# Patient Record
Sex: FEMALE | Race: WHITE | Employment: OTHER | URBAN - NONMETROPOLITAN AREA
[De-identification: names, ages, dates, MRNs, and addresses within clinical notes are randomized per-mention and may not be internally consistent; named-entity substitution may affect disease eponyms.]

---

## 2017-01-11 ENCOUNTER — HOSPITAL ENCOUNTER (OUTPATIENT)
Dept: GENERAL RADIOLOGY | Age: 72
Discharge: HOME | End: 2017-01-11
Payer: MEDICARE

## 2017-01-11 DIAGNOSIS — Z01.818 PREOP EXAMINATION: ICD-10-CM

## 2017-01-11 PROCEDURE — 71020 XR CHEST STANDARD TWO VW: CPT

## 2017-08-15 ENCOUNTER — TRANSCRIBE ORDERS (OUTPATIENT)
Dept: ADMINISTRATIVE | Facility: HOSPITAL | Age: 72
End: 2017-08-15

## 2017-08-15 DIAGNOSIS — R10.9 FLANK PAIN: Primary | ICD-10-CM

## 2017-08-16 ENCOUNTER — APPOINTMENT (OUTPATIENT)
Dept: CT IMAGING | Facility: HOSPITAL | Age: 72
End: 2017-08-16
Attending: FAMILY MEDICINE

## 2017-08-18 ENCOUNTER — HOSPITAL ENCOUNTER (OUTPATIENT)
Dept: CT IMAGING | Facility: HOSPITAL | Age: 72
Discharge: HOME | End: 2017-08-18
Attending: FAMILY MEDICINE | Admitting: FAMILY MEDICINE

## 2017-08-18 DIAGNOSIS — R10.9 FLANK PAIN: ICD-10-CM

## 2017-08-18 LAB — CREAT BLDA-MCNC: 0.8 MG/DL (ref 0.6–1.3)

## 2017-08-18 PROCEDURE — 74176 CT ABD & PELVIS W/O CONTRAST: CPT

## 2017-08-18 PROCEDURE — 82565 ASSAY OF CREATININE: CPT

## 2017-11-30 ENCOUNTER — OFFICE VISIT (OUTPATIENT)
Dept: SURGERY | Age: 72
End: 2017-11-30
Payer: MEDICARE

## 2017-11-30 VITALS
SYSTOLIC BLOOD PRESSURE: 118 MMHG | BODY MASS INDEX: 24.71 KG/M2 | HEIGHT: 68 IN | HEART RATE: 76 BPM | DIASTOLIC BLOOD PRESSURE: 80 MMHG | WEIGHT: 163 LBS

## 2017-11-30 DIAGNOSIS — N63.12 MASS OF UPPER INNER QUADRANT OF RIGHT BREAST: Primary | ICD-10-CM

## 2017-11-30 PROCEDURE — 1036F TOBACCO NON-USER: CPT | Performed by: PHYSICIAN ASSISTANT

## 2017-11-30 PROCEDURE — 1090F PRES/ABSN URINE INCON ASSESS: CPT | Performed by: PHYSICIAN ASSISTANT

## 2017-11-30 PROCEDURE — G8420 CALC BMI NORM PARAMETERS: HCPCS | Performed by: PHYSICIAN ASSISTANT

## 2017-11-30 PROCEDURE — 1123F ACP DISCUSS/DSCN MKR DOCD: CPT | Performed by: PHYSICIAN ASSISTANT

## 2017-11-30 PROCEDURE — G8427 DOCREV CUR MEDS BY ELIG CLIN: HCPCS | Performed by: PHYSICIAN ASSISTANT

## 2017-11-30 PROCEDURE — 99202 OFFICE O/P NEW SF 15 MIN: CPT | Performed by: PHYSICIAN ASSISTANT

## 2017-11-30 PROCEDURE — 3017F COLORECTAL CA SCREEN DOC REV: CPT | Performed by: PHYSICIAN ASSISTANT

## 2017-11-30 PROCEDURE — 4040F PNEUMOC VAC/ADMIN/RCVD: CPT | Performed by: PHYSICIAN ASSISTANT

## 2017-11-30 PROCEDURE — 3014F SCREEN MAMMO DOC REV: CPT | Performed by: PHYSICIAN ASSISTANT

## 2017-11-30 PROCEDURE — G8400 PT W/DXA NO RESULTS DOC: HCPCS | Performed by: PHYSICIAN ASSISTANT

## 2017-11-30 PROCEDURE — G8484 FLU IMMUNIZE NO ADMIN: HCPCS | Performed by: PHYSICIAN ASSISTANT

## 2017-11-30 RX ORDER — ASCORBIC ACID 500 MG
500 TABLET ORAL DAILY
COMMUNITY

## 2017-11-30 RX ORDER — PRAVASTATIN SODIUM 40 MG
20 TABLET ORAL DAILY
COMMUNITY
Start: 2017-10-30

## 2017-11-30 RX ORDER — DIAZEPAM 5 MG/1
TABLET ORAL
Qty: 10 TABLET | Refills: 0 | OUTPATIENT
Start: 2017-11-30 | End: 2022-01-27

## 2017-11-30 RX ORDER — AMOXICILLIN 500 MG
1 CAPSULE ORAL 2 TIMES DAILY
COMMUNITY

## 2017-11-30 RX ORDER — CALCIUM CARBONATE 500(1250)
500 TABLET ORAL DAILY
COMMUNITY

## 2017-11-30 RX ORDER — METOPROLOL SUCCINATE 25 MG/1
25 TABLET, EXTENDED RELEASE ORAL NIGHTLY
COMMUNITY
Start: 2017-10-30

## 2017-12-04 ENCOUNTER — HOSPITAL ENCOUNTER (OUTPATIENT)
Dept: WOMENS IMAGING | Age: 72
Discharge: HOME | End: 2017-12-04
Payer: MEDICARE

## 2017-12-04 DIAGNOSIS — N63.12 MASS OF UPPER INNER QUADRANT OF RIGHT BREAST: ICD-10-CM

## 2017-12-04 PROCEDURE — 19083 BX BREAST 1ST LESION US IMAG: CPT

## 2017-12-04 PROCEDURE — G0206 DX MAMMO INCL CAD UNI: HCPCS

## 2017-12-04 PROCEDURE — 88305 TISSUE EXAM BY PATHOLOGIST: CPT

## 2017-12-04 PROCEDURE — 88374 M/PHMTRC ALYS ISHQUANT/SEMIQ: CPT

## 2017-12-04 PROCEDURE — 88361 TUMOR IMMUNOHISTOCHEM/COMPUT: CPT

## 2017-12-07 ENCOUNTER — TELEPHONE (OUTPATIENT)
Dept: SURGERY | Age: 72
End: 2017-12-07

## 2017-12-07 DIAGNOSIS — C50.211 MALIGNANT NEOPLASM OF UPPER-INNER QUADRANT OF RIGHT FEMALE BREAST, UNSPECIFIED ESTROGEN RECEPTOR STATUS (HCC): Primary | ICD-10-CM

## 2017-12-08 DIAGNOSIS — C50.211 MALIGNANT NEOPLASM OF UPPER-INNER QUADRANT OF RIGHT FEMALE BREAST, UNSPECIFIED ESTROGEN RECEPTOR STATUS (HCC): Primary | ICD-10-CM

## 2017-12-12 NOTE — PROGRESS NOTES
normal.   Eyes: EOM are normal. Pupils are equal, round, and reactive to light. Pulmonary/Chest: Right breast exhibits mass. Right breast exhibits no inverted nipple, no nipple discharge, no skin change and no tenderness. Left breast exhibits no inverted nipple, no mass, no nipple discharge, no skin change and no tenderness. Breasts are symmetrical.   Neurological: She is alert and oriented to person, place, and time. No cranial nerve deficit. Skin: Skin is warm and dry. No rash noted. She is not diaphoretic. No erythema. No pallor. Psychiatric: She has a normal mood and affect. Her behavior is normal. Judgment and thought content normal.       Assessment:      Right breast mass      Plan:      PLAN:  This will require US guided mammotome biopsy, which will be done in the hospital under local anesthesia. Further procedures will be done as indicated. CONSENT:  The risks, benefits and options of biopsy/US were discussed with her including but not limited to bleeding, infection, hematoma, missing the lesion, and scarring. She expresses good understanding and is agreeable to proceed.

## 2017-12-14 ENCOUNTER — HOSPITAL ENCOUNTER (OUTPATIENT)
Dept: MRI IMAGING | Age: 72
Discharge: HOME | End: 2017-12-14
Payer: MEDICARE

## 2017-12-14 DIAGNOSIS — C50.211 MALIGNANT NEOPLASM OF UPPER-INNER QUADRANT OF RIGHT FEMALE BREAST, UNSPECIFIED ESTROGEN RECEPTOR STATUS (HCC): ICD-10-CM

## 2017-12-14 LAB
GFR NON-AFRICAN AMERICAN: >60
PERFORMED ON: NORMAL
POC CREATININE: 0.9 MG/DL (ref 0.3–1.3)
POC SAMPLE TYPE: NORMAL

## 2017-12-14 PROCEDURE — A9577 INJ MULTIHANCE: HCPCS | Performed by: PHYSICIAN ASSISTANT

## 2017-12-14 PROCEDURE — 6360000004 HC RX CONTRAST MEDICATION: Performed by: PHYSICIAN ASSISTANT

## 2017-12-14 PROCEDURE — 82565 ASSAY OF CREATININE: CPT

## 2017-12-14 PROCEDURE — C8908 MRI W/O FOL W/CONT, BREAST,: HCPCS

## 2017-12-14 RX ADMIN — GADOBENATE DIMEGLUMINE 15 ML: 529 INJECTION, SOLUTION INTRAVENOUS at 10:09

## 2017-12-18 ENCOUNTER — TELEPHONE (OUTPATIENT)
Dept: WOMENS IMAGING | Age: 72
End: 2017-12-18

## 2017-12-27 ENCOUNTER — HOSPITAL ENCOUNTER (OUTPATIENT)
Dept: WOMENS IMAGING | Age: 72
Discharge: HOME | End: 2017-12-27
Payer: MEDICARE

## 2017-12-27 ENCOUNTER — INITIAL CONSULT (OUTPATIENT)
Dept: SURGERY | Age: 72
End: 2017-12-27
Payer: MEDICARE

## 2017-12-27 DIAGNOSIS — R92.8 ABNORMAL MRI, BREAST: ICD-10-CM

## 2017-12-27 DIAGNOSIS — C50.211 MALIGNANT NEOPLASM OF UPPER-INNER QUADRANT OF RIGHT FEMALE BREAST, UNSPECIFIED ESTROGEN RECEPTOR STATUS (HCC): ICD-10-CM

## 2017-12-27 DIAGNOSIS — C50.211 MALIGNANT NEOPLASM OF UPPER-INNER QUADRANT OF RIGHT BREAST IN FEMALE, ESTROGEN RECEPTOR POSITIVE (HCC): Primary | ICD-10-CM

## 2017-12-27 DIAGNOSIS — Z17.0 MALIGNANT NEOPLASM OF UPPER-INNER QUADRANT OF RIGHT BREAST IN FEMALE, ESTROGEN RECEPTOR POSITIVE (HCC): Primary | ICD-10-CM

## 2017-12-27 PROCEDURE — G8428 CUR MEDS NOT DOCUMENT: HCPCS | Performed by: SURGERY

## 2017-12-27 PROCEDURE — G8400 PT W/DXA NO RESULTS DOC: HCPCS | Performed by: SURGERY

## 2017-12-27 PROCEDURE — 3014F SCREEN MAMMO DOC REV: CPT | Performed by: SURGERY

## 2017-12-27 PROCEDURE — 1123F ACP DISCUSS/DSCN MKR DOCD: CPT | Performed by: SURGERY

## 2017-12-27 PROCEDURE — 99355 PR PROLONGED SVC OUTPATIENT SETTING EA ADDL 30 MIN: CPT | Performed by: SURGERY

## 2017-12-27 PROCEDURE — 99215 OFFICE O/P EST HI 40 MIN: CPT | Performed by: SURGERY

## 2017-12-27 PROCEDURE — 76642 ULTRASOUND BREAST LIMITED: CPT

## 2017-12-27 PROCEDURE — 99354 PR PROLONGED SVC OUTPATIENT SETTING 1ST HOUR: CPT | Performed by: SURGERY

## 2017-12-27 PROCEDURE — 3017F COLORECTAL CA SCREEN DOC REV: CPT | Performed by: SURGERY

## 2017-12-27 PROCEDURE — 1036F TOBACCO NON-USER: CPT | Performed by: SURGERY

## 2017-12-27 PROCEDURE — 4040F PNEUMOC VAC/ADMIN/RCVD: CPT | Performed by: SURGERY

## 2017-12-27 PROCEDURE — G8484 FLU IMMUNIZE NO ADMIN: HCPCS | Performed by: SURGERY

## 2017-12-27 PROCEDURE — 1090F PRES/ABSN URINE INCON ASSESS: CPT | Performed by: SURGERY

## 2017-12-27 PROCEDURE — G8420 CALC BMI NORM PARAMETERS: HCPCS | Performed by: SURGERY

## 2018-01-02 ENCOUNTER — TELEPHONE (OUTPATIENT)
Dept: SURGERY | Age: 73
End: 2018-01-02

## 2018-01-02 ENCOUNTER — OFFICE VISIT (OUTPATIENT)
Dept: SURGERY | Age: 73
End: 2018-01-02
Payer: MEDICARE

## 2018-01-02 VITALS
SYSTOLIC BLOOD PRESSURE: 122 MMHG | HEIGHT: 68 IN | HEART RATE: 72 BPM | WEIGHT: 163 LBS | DIASTOLIC BLOOD PRESSURE: 74 MMHG | BODY MASS INDEX: 24.71 KG/M2

## 2018-01-02 DIAGNOSIS — Z17.0 MALIGNANT NEOPLASM OF UPPER-INNER QUADRANT OF RIGHT BREAST IN FEMALE, ESTROGEN RECEPTOR POSITIVE (HCC): Primary | ICD-10-CM

## 2018-01-02 DIAGNOSIS — C50.211 MALIGNANT NEOPLASM OF UPPER-INNER QUADRANT OF RIGHT BREAST IN FEMALE, ESTROGEN RECEPTOR POSITIVE (HCC): Primary | ICD-10-CM

## 2018-01-02 PROCEDURE — 1090F PRES/ABSN URINE INCON ASSESS: CPT | Performed by: SURGERY

## 2018-01-02 PROCEDURE — 3017F COLORECTAL CA SCREEN DOC REV: CPT | Performed by: SURGERY

## 2018-01-02 PROCEDURE — G8400 PT W/DXA NO RESULTS DOC: HCPCS | Performed by: SURGERY

## 2018-01-02 PROCEDURE — G8484 FLU IMMUNIZE NO ADMIN: HCPCS | Performed by: SURGERY

## 2018-01-02 PROCEDURE — G8420 CALC BMI NORM PARAMETERS: HCPCS | Performed by: SURGERY

## 2018-01-02 PROCEDURE — G8428 CUR MEDS NOT DOCUMENT: HCPCS | Performed by: SURGERY

## 2018-01-02 PROCEDURE — 1036F TOBACCO NON-USER: CPT | Performed by: SURGERY

## 2018-01-02 PROCEDURE — 1123F ACP DISCUSS/DSCN MKR DOCD: CPT | Performed by: SURGERY

## 2018-01-02 PROCEDURE — 4040F PNEUMOC VAC/ADMIN/RCVD: CPT | Performed by: SURGERY

## 2018-01-02 PROCEDURE — 99215 OFFICE O/P EST HI 40 MIN: CPT | Performed by: SURGERY

## 2018-01-02 PROCEDURE — 99354 PR PROLONGED SVC OUTPATIENT SETTING 1ST HOUR: CPT | Performed by: SURGERY

## 2018-01-02 NOTE — TELEPHONE ENCOUNTER
Called and gave patient the time of her appointment for today. 01/02/18 at 4. Patient voiced her understanding.

## 2018-01-08 ENCOUNTER — HOSPITAL ENCOUNTER (OUTPATIENT)
Dept: LAB | Age: 73
Discharge: HOME | End: 2018-01-08
Payer: MEDICARE

## 2018-01-08 ENCOUNTER — HOSPITAL ENCOUNTER (OUTPATIENT)
Dept: NON INVASIVE DIAGNOSTICS | Age: 73
Discharge: HOME | End: 2018-01-08
Payer: MEDICARE

## 2018-01-08 ENCOUNTER — HOSPITAL ENCOUNTER (OUTPATIENT)
Dept: PREADMISSION TESTING | Age: 73
Setting detail: OUTPATIENT SURGERY
Discharge: HOME | End: 2018-01-08

## 2018-01-08 LAB
ANION GAP SERPL CALCULATED.3IONS-SCNC: 10 MMOL/L (ref 7–19)
BASOPHILS ABSOLUTE: 0 K/UL (ref 0–0.2)
BASOPHILS RELATIVE PERCENT: 0.5 % (ref 0–1)
BUN BLDV-MCNC: 17 MG/DL (ref 8–23)
CALCIUM SERPL-MCNC: 9.5 MG/DL (ref 8.8–10.2)
CHLORIDE BLD-SCNC: 103 MMOL/L (ref 98–111)
CO2: 32 MMOL/L (ref 22–29)
CREAT SERPL-MCNC: 0.7 MG/DL (ref 0.5–0.9)
EOSINOPHILS ABSOLUTE: 0.1 K/UL (ref 0–0.6)
EOSINOPHILS RELATIVE PERCENT: 2.2 % (ref 0–5)
GFR NON-AFRICAN AMERICAN: >60
GLUCOSE BLD-MCNC: 85 MG/DL (ref 74–109)
HCT VFR BLD CALC: 44.4 % (ref 37–47)
HEMOGLOBIN: 13.8 G/DL (ref 12–16)
LYMPHOCYTES ABSOLUTE: 1.3 K/UL (ref 1.1–4.5)
LYMPHOCYTES RELATIVE PERCENT: 23.3 % (ref 20–40)
MCH RBC QN AUTO: 28.5 PG (ref 27–31)
MCHC RBC AUTO-ENTMCNC: 31.1 G/DL (ref 33–37)
MCV RBC AUTO: 91.7 FL (ref 81–99)
MONOCYTES ABSOLUTE: 0.5 K/UL (ref 0–0.9)
MONOCYTES RELATIVE PERCENT: 8.5 % (ref 0–10)
NEUTROPHILS ABSOLUTE: 3.6 K/UL (ref 1.5–7.5)
NEUTROPHILS RELATIVE PERCENT: 65.3 % (ref 50–65)
PDW BLD-RTO: 13.6 % (ref 11.5–14.5)
PLATELET # BLD: 268 K/UL (ref 130–400)
PMV BLD AUTO: 10.3 FL (ref 9.4–12.3)
POTASSIUM SERPL-SCNC: 3.9 MMOL/L (ref 3.5–5)
RBC # BLD: 4.84 M/UL (ref 4.2–5.4)
SODIUM BLD-SCNC: 145 MMOL/L (ref 136–145)
WBC # BLD: 5.5 K/UL (ref 4.8–10.8)

## 2018-01-08 PROCEDURE — 93005 ELECTROCARDIOGRAM TRACING: CPT

## 2018-01-08 PROCEDURE — 36415 COLL VENOUS BLD VENIPUNCTURE: CPT

## 2018-01-08 PROCEDURE — 85025 COMPLETE CBC W/AUTO DIFF WBC: CPT

## 2018-01-08 PROCEDURE — 80048 BASIC METABOLIC PNL TOTAL CA: CPT

## 2018-01-08 RX ORDER — LETROZOLE 2.5 MG/1
2.5 TABLET, FILM COATED ORAL DAILY
COMMUNITY
End: 2018-10-10 | Stop reason: ALTCHOICE

## 2018-01-11 ENCOUNTER — ANESTHESIA (OUTPATIENT)
Dept: OPERATING ROOM | Age: 73
End: 2018-01-11

## 2018-01-11 ENCOUNTER — HOSPITAL ENCOUNTER (OUTPATIENT)
Age: 73
Setting detail: OUTPATIENT SURGERY
Discharge: HOME | End: 2018-01-11
Attending: SURGERY | Admitting: SURGERY
Payer: MEDICARE

## 2018-01-11 ENCOUNTER — ANESTHESIA EVENT (OUTPATIENT)
Dept: OPERATING ROOM | Age: 73
End: 2018-01-11

## 2018-01-11 ENCOUNTER — HOSPITAL ENCOUNTER (OUTPATIENT)
Dept: GENERAL RADIOLOGY | Age: 73
Discharge: HOME | End: 2018-01-11
Payer: MEDICARE

## 2018-01-11 VITALS
OXYGEN SATURATION: 99 % | DIASTOLIC BLOOD PRESSURE: 82 MMHG | RESPIRATION RATE: 18 BRPM | HEIGHT: 66 IN | SYSTOLIC BLOOD PRESSURE: 137 MMHG | BODY MASS INDEX: 25.71 KG/M2 | WEIGHT: 160 LBS | TEMPERATURE: 98.6 F | HEART RATE: 64 BPM

## 2018-01-11 VITALS — DIASTOLIC BLOOD PRESSURE: 76 MMHG | OXYGEN SATURATION: 82 % | SYSTOLIC BLOOD PRESSURE: 121 MMHG

## 2018-01-11 PROCEDURE — C1788 PORT, INDWELLING, IMP: HCPCS | Performed by: SURGERY

## 2018-01-11 PROCEDURE — G8916 PT W IV AB GIVEN ON TIME: HCPCS

## 2018-01-11 PROCEDURE — 99999 PR OFFICE/OUTPT VISIT,PROCEDURE ONLY: CPT | Performed by: PHYSICIAN ASSISTANT

## 2018-01-11 PROCEDURE — 77001 FLUOROGUIDE FOR VEIN DEVICE: CPT | Performed by: SURGERY

## 2018-01-11 PROCEDURE — 36561 INSERT TUNNELED CV CATH: CPT

## 2018-01-11 PROCEDURE — 36561 INSERT TUNNELED CV CATH: CPT | Performed by: SURGERY

## 2018-01-11 PROCEDURE — G8907 PT DOC NO EVENTS ON DISCHARG: HCPCS

## 2018-01-11 PROCEDURE — 71045 X-RAY EXAM CHEST 1 VIEW: CPT

## 2018-01-11 DEVICE — PORT INFUS PLAS SGL LUMN W/ 9.6FR SIL CATH AIRGUARD VLV: Type: IMPLANTABLE DEVICE | Site: CHEST | Status: FUNCTIONAL

## 2018-01-11 RX ORDER — MIDAZOLAM HYDROCHLORIDE 1 MG/ML
INJECTION INTRAMUSCULAR; INTRAVENOUS PRN
Status: DISCONTINUED | OUTPATIENT
Start: 2018-01-11 | End: 2018-01-11 | Stop reason: SDUPTHER

## 2018-01-11 RX ORDER — HEPARIN SODIUM (PORCINE) LOCK FLUSH IV SOLN 100 UNIT/ML 100 UNIT/ML
SOLUTION INTRAVENOUS PRN
Status: DISCONTINUED | OUTPATIENT
Start: 2018-01-11 | End: 2018-01-11 | Stop reason: HOSPADM

## 2018-01-11 RX ORDER — LIDOCAINE HYDROCHLORIDE 10 MG/ML
INJECTION, SOLUTION EPIDURAL; INFILTRATION; INTRACAUDAL; PERINEURAL PRN
Status: DISCONTINUED | OUTPATIENT
Start: 2018-01-11 | End: 2018-01-11 | Stop reason: SDUPTHER

## 2018-01-11 RX ORDER — PROPOFOL 10 MG/ML
INJECTION, EMULSION INTRAVENOUS PRN
Status: DISCONTINUED | OUTPATIENT
Start: 2018-01-11 | End: 2018-01-11 | Stop reason: SDUPTHER

## 2018-01-11 RX ORDER — LIDOCAINE HYDROCHLORIDE 10 MG/ML
1 INJECTION, SOLUTION EPIDURAL; INFILTRATION; INTRACAUDAL; PERINEURAL
Status: DISCONTINUED | OUTPATIENT
Start: 2018-01-11 | End: 2018-01-11 | Stop reason: HOSPADM

## 2018-01-11 RX ORDER — HYDROCODONE BITARTRATE AND ACETAMINOPHEN 5; 325 MG/1; MG/1
TABLET ORAL
Qty: 15 TABLET | Refills: 0 | Status: SHIPPED | OUTPATIENT
Start: 2018-01-11 | End: 2018-01-17

## 2018-01-11 RX ORDER — HYDROCODONE BITARTRATE AND ACETAMINOPHEN 5; 325 MG/1; MG/1
1 TABLET ORAL EVERY 4 HOURS PRN
Status: DISCONTINUED | OUTPATIENT
Start: 2018-01-11 | End: 2018-01-11 | Stop reason: HOSPADM

## 2018-01-11 RX ORDER — SODIUM CHLORIDE, SODIUM LACTATE, POTASSIUM CHLORIDE, CALCIUM CHLORIDE 600; 310; 30; 20 MG/100ML; MG/100ML; MG/100ML; MG/100ML
INJECTION, SOLUTION INTRAVENOUS CONTINUOUS
Status: DISCONTINUED | OUTPATIENT
Start: 2018-01-11 | End: 2018-01-11 | Stop reason: HOSPADM

## 2018-01-11 RX ORDER — FENTANYL CITRATE 50 UG/ML
INJECTION, SOLUTION INTRAMUSCULAR; INTRAVENOUS PRN
Status: DISCONTINUED | OUTPATIENT
Start: 2018-01-11 | End: 2018-01-11 | Stop reason: SDUPTHER

## 2018-01-11 RX ADMIN — FENTANYL CITRATE 50 MCG: 50 INJECTION, SOLUTION INTRAMUSCULAR; INTRAVENOUS at 10:05

## 2018-01-11 RX ADMIN — HYDROCODONE BITARTRATE AND ACETAMINOPHEN 1 TABLET: 5; 325 TABLET ORAL at 11:51

## 2018-01-11 RX ADMIN — MIDAZOLAM HYDROCHLORIDE 1 MG: 1 INJECTION INTRAMUSCULAR; INTRAVENOUS at 10:05

## 2018-01-11 RX ADMIN — PROPOFOL 150 MG: 10 INJECTION, EMULSION INTRAVENOUS at 10:06

## 2018-01-11 RX ADMIN — LIDOCAINE HYDROCHLORIDE 10 ML: 10 INJECTION, SOLUTION EPIDURAL; INFILTRATION; INTRACAUDAL; PERINEURAL at 10:42

## 2018-01-11 RX ADMIN — SODIUM CHLORIDE, SODIUM LACTATE, POTASSIUM CHLORIDE, CALCIUM CHLORIDE: 600; 310; 30; 20 INJECTION, SOLUTION INTRAVENOUS at 08:47

## 2018-01-11 RX ADMIN — LIDOCAINE HYDROCHLORIDE 10 ML: 10 INJECTION, SOLUTION EPIDURAL; INFILTRATION; INTRACAUDAL; PERINEURAL at 10:25

## 2018-01-11 ASSESSMENT — PAIN SCALES - GENERAL: PAINLEVEL_OUTOF10: 5

## 2018-01-11 NOTE — H&P
EXAM:    The patient is a 67 y.o. female  in no acute distress. She is alert oriented and cooperative. HEENT: Normocephalic and atraumatic. EOMs intact. Pupils equal and round and reactive to light and accommodation. Sclere nonicteric. Oropharynx without masses or lesions. Neck: Neck is supple without masses or thyromegaly    Chest: Lungs are clear to auscultation    Cardiac: Regular rate and rhythm without rubs, murmurs, or gallops    Breasts: The breasts are symmetrical. There are fibrocystic changes throughout both breasts. There are no dominant masses, no skin or nipple changes, and no axillary adenopathy. Abdomen: The abdomen is soft and nontender with no hepatosplenomegaly. Extremities: The extremities are normal. There are no signs of clubbing, cyanosis, or edema. 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 Stephen Lara  and her family about the ramifications of the diagnosis of breast cancer. We discussed the pathophysiology of cancer in general and also the ways in which surgery, radiation therapy, and chemotherapy are utilized in the treatment of different types of cancers. We also explained how these modalities related to her situation in particular. We discussed the pathophysiology of breast cancer and some length, including what is known about the causes of breast cancer, its relationship to fibrocystic disease, its relationship to hormone replacement therapy, and some of the genetic aspects involved in familial breast cancers. We discussed the BrCa genetic analysis and why it is not appropriate for her. We discussed breast MRI and how it assists in evaluation of breast cancers and the results of her MRI if done.       We discussed the surgical options including simple mastectomy and lumpectomy with  sentinel lymph node biopsy as well as the possibility of axillary lymph node dissection.   We explained in depth why breast conservation therapy requires radiation treatments for the majority of women. These treatments may be external beam for 6 weeks, partial breast for 5 days,  or intraoperative. I explained that most women treated for invasive malignancy do receive systemic therapy, hormonal therapy or  chemotherapy postoperatively depending upon the final pathology, the lymph node status, and the hormone receptor status. I discussed Oncotype Dx, Mammoprint, and Adjuvant Online as tools which aid in the decision for chemotherapy or hormonal therapy. We also discussed the possibility of breast reconstruction if a mastectomy was required. .  I explained to her the different techniques including placement of a subpectoral implant with a Alloderm sling  versus TRAM flap reconstruction as welll as other methods of reconstruction. She does not wish to pursue reconstruction at this time.         After a prolonged discussion lasting 90 minutes  we felt it was most appropriate that she undergo neoadjuvant hormonal therapy.       She was started on Femara for this and we anticipate following up in 6-8 weeks.     Unfortunately the HER-2 positive by FISH was not available at that time and after discussion with Dr. Vimal Remy, we will need to see her back for consideration of neoadjuvant chemotherapy instead. We will see her back in the next few days to discuss this further. She has been notified. .     We had additional discussion for about an hour today. We discussed HER-2, and what chemotherapy would likely entail. She had many additional questions. We will plan to have her see Dr. Vimal Remy soon to begin her chemotherapy including anterior HER-2 therapy. We will schedule her for port for a day or so after she sees Dr. Vimal Remy.     We discussed the risks and benefits of port placement. We discussed the overall plan and she and her  seem to have a good understanding.

## 2018-01-12 NOTE — OP NOTE
attached  the port in the usual fashion, sutured the port in place with 2-0 silk  stitches, flushed and aspirated the port with heparinized saline, irrigated  the pocket with Kefzol-bacitracin solution and closed with 3-0 Vicryl for  subcu and 4-0 Monocryl for the skin. A sterile dressing was applied. Estimated blood loss, minimal.  Complications, none. She tolerated the  procedure well.         Kenzie Cannon MD    D: 01/11/2018 12:20:43       T: 01/11/2018 12:42:18     [id number]  Job#: [id number]     Doc#: [id number]    CC:

## 2018-01-17 PROBLEM — Z17.0 MALIGNANT NEOPLASM OF UPPER-INNER QUADRANT OF RIGHT BREAST IN FEMALE, ESTROGEN RECEPTOR POSITIVE (HCC): Status: ACTIVE | Noted: 2017-12-08

## 2018-01-24 ENCOUNTER — HOSPITAL ENCOUNTER (OUTPATIENT)
Dept: NUCLEAR MEDICINE | Age: 73
Discharge: HOME | End: 2018-01-24
Payer: MEDICARE

## 2018-01-24 ENCOUNTER — HOSPITAL ENCOUNTER (OUTPATIENT)
Dept: CT IMAGING | Age: 73
Discharge: HOME | End: 2018-01-24
Payer: MEDICARE

## 2018-01-24 ENCOUNTER — HOSPITAL ENCOUNTER (OUTPATIENT)
Dept: NON INVASIVE DIAGNOSTICS | Age: 73
Discharge: HOME | End: 2018-01-24
Payer: MEDICARE

## 2018-01-24 DIAGNOSIS — C50.211 MALIGNANT NEOPLASM OF UPPER-INNER QUADRANT OF RIGHT FEMALE BREAST, UNSPECIFIED ESTROGEN RECEPTOR STATUS (HCC): ICD-10-CM

## 2018-01-24 DIAGNOSIS — Z51.81 ENCOUNTER FOR THERAPEUTIC DRUG MONITORING: ICD-10-CM

## 2018-01-24 LAB
LV EF: 58 %
LVEF MODALITY: NORMAL

## 2018-01-24 PROCEDURE — 3430000000 HC RX DIAGNOSTIC RADIOPHARMACEUTICAL: Performed by: INTERNAL MEDICINE

## 2018-01-24 PROCEDURE — 74177 CT ABD & PELVIS W/CONTRAST: CPT

## 2018-01-24 PROCEDURE — 93306 TTE W/DOPPLER COMPLETE: CPT

## 2018-01-24 PROCEDURE — A9561 TC99M OXIDRONATE: HCPCS | Performed by: INTERNAL MEDICINE

## 2018-01-24 PROCEDURE — 6360000004 HC RX CONTRAST MEDICATION: Performed by: INTERNAL MEDICINE

## 2018-01-24 PROCEDURE — 78306 BONE IMAGING WHOLE BODY: CPT

## 2018-01-24 PROCEDURE — 71260 CT THORAX DX C+: CPT

## 2018-01-24 RX ADMIN — TECHNETIUM TC 99M OXIDRONATE 20 MILLICURIE: 3.15 INJECTION, POWDER, LYOPHILIZED, FOR SOLUTION INTRAVENOUS at 13:31

## 2018-01-24 RX ADMIN — IOPAMIDOL 75 ML: 755 INJECTION, SOLUTION INTRAVENOUS at 10:39

## 2018-03-09 ENCOUNTER — TELEPHONE (OUTPATIENT)
Dept: SURGERY | Age: 73
End: 2018-03-09

## 2018-03-09 DIAGNOSIS — C50.211 MALIGNANT NEOPLASM OF UPPER-INNER QUADRANT OF RIGHT BREAST IN FEMALE, ESTROGEN RECEPTOR POSITIVE (HCC): Primary | ICD-10-CM

## 2018-03-09 DIAGNOSIS — Z17.0 MALIGNANT NEOPLASM OF UPPER-INNER QUADRANT OF RIGHT BREAST IN FEMALE, ESTROGEN RECEPTOR POSITIVE (HCC): Primary | ICD-10-CM

## 2018-03-19 ENCOUNTER — OFFICE VISIT (OUTPATIENT)
Dept: SURGERY | Age: 73
End: 2018-03-19
Payer: MEDICARE

## 2018-03-19 ENCOUNTER — HOSPITAL ENCOUNTER (OUTPATIENT)
Dept: ULTRASOUND IMAGING | Age: 73
Discharge: HOME | End: 2018-03-19
Payer: MEDICARE

## 2018-03-19 VITALS
HEIGHT: 68 IN | HEART RATE: 68 BPM | RESPIRATION RATE: 16 BRPM | WEIGHT: 160.8 LBS | DIASTOLIC BLOOD PRESSURE: 68 MMHG | BODY MASS INDEX: 24.37 KG/M2 | SYSTOLIC BLOOD PRESSURE: 124 MMHG

## 2018-03-19 DIAGNOSIS — C50.211 MALIGNANT NEOPLASM OF UPPER-INNER QUADRANT OF RIGHT BREAST IN FEMALE, ESTROGEN RECEPTOR POSITIVE (HCC): ICD-10-CM

## 2018-03-19 DIAGNOSIS — Z17.0 MALIGNANT NEOPLASM OF UPPER-INNER QUADRANT OF RIGHT BREAST IN FEMALE, ESTROGEN RECEPTOR POSITIVE (HCC): ICD-10-CM

## 2018-03-19 DIAGNOSIS — Z17.0 MALIGNANT NEOPLASM OF UPPER-INNER QUADRANT OF RIGHT BREAST IN FEMALE, ESTROGEN RECEPTOR POSITIVE (HCC): Primary | ICD-10-CM

## 2018-03-19 DIAGNOSIS — C50.211 MALIGNANT NEOPLASM OF UPPER-INNER QUADRANT OF RIGHT BREAST IN FEMALE, ESTROGEN RECEPTOR POSITIVE (HCC): Primary | ICD-10-CM

## 2018-03-19 PROCEDURE — 1036F TOBACCO NON-USER: CPT | Performed by: SURGERY

## 2018-03-19 PROCEDURE — G8484 FLU IMMUNIZE NO ADMIN: HCPCS | Performed by: SURGERY

## 2018-03-19 PROCEDURE — 3017F COLORECTAL CA SCREEN DOC REV: CPT | Performed by: SURGERY

## 2018-03-19 PROCEDURE — 76642 ULTRASOUND BREAST LIMITED: CPT

## 2018-03-19 PROCEDURE — 4040F PNEUMOC VAC/ADMIN/RCVD: CPT | Performed by: SURGERY

## 2018-03-19 PROCEDURE — G8420 CALC BMI NORM PARAMETERS: HCPCS | Performed by: SURGERY

## 2018-03-19 PROCEDURE — 1090F PRES/ABSN URINE INCON ASSESS: CPT | Performed by: SURGERY

## 2018-03-19 PROCEDURE — 1123F ACP DISCUSS/DSCN MKR DOCD: CPT | Performed by: SURGERY

## 2018-03-19 PROCEDURE — G8427 DOCREV CUR MEDS BY ELIG CLIN: HCPCS | Performed by: SURGERY

## 2018-03-19 PROCEDURE — 99213 OFFICE O/P EST LOW 20 MIN: CPT | Performed by: SURGERY

## 2018-03-19 PROCEDURE — 3014F SCREEN MAMMO DOC REV: CPT | Performed by: SURGERY

## 2018-03-19 PROCEDURE — G8400 PT W/DXA NO RESULTS DOC: HCPCS | Performed by: SURGERY

## 2018-05-10 ENCOUNTER — TRANSCRIBE ORDERS (OUTPATIENT)
Dept: ADMINISTRATIVE | Facility: HOSPITAL | Age: 73
End: 2018-05-10

## 2018-05-10 DIAGNOSIS — N85.2 ENLARGED UTERUS: ICD-10-CM

## 2018-05-10 DIAGNOSIS — N28.1 BILATERAL RENAL CYSTS: Primary | ICD-10-CM

## 2018-05-11 ENCOUNTER — HOSPITAL ENCOUNTER (OUTPATIENT)
Dept: CT IMAGING | Age: 73
Discharge: HOME | End: 2018-05-11
Payer: MEDICARE

## 2018-05-11 DIAGNOSIS — C50.211 MALIGNANT NEOPLASM OF UPPER-INNER QUADRANT OF RIGHT FEMALE BREAST, UNSPECIFIED ESTROGEN RECEPTOR STATUS (HCC): ICD-10-CM

## 2018-05-11 LAB
GFR NON-AFRICAN AMERICAN: >60
PERFORMED ON: NORMAL
POC CREATININE: 0.7 MG/DL (ref 0.3–1.3)
POC SAMPLE TYPE: NORMAL

## 2018-05-11 PROCEDURE — 6360000004 HC RX CONTRAST MEDICATION: Performed by: INTERNAL MEDICINE

## 2018-05-11 PROCEDURE — 82565 ASSAY OF CREATININE: CPT

## 2018-05-11 PROCEDURE — 71260 CT THORAX DX C+: CPT

## 2018-05-11 RX ADMIN — IOPAMIDOL 60 ML: 755 INJECTION, SOLUTION INTRAVENOUS at 09:04

## 2018-05-21 ENCOUNTER — OFFICE VISIT (OUTPATIENT)
Dept: SURGERY | Age: 73
End: 2018-05-21
Payer: COMMERCIAL

## 2018-05-21 VITALS
SYSTOLIC BLOOD PRESSURE: 122 MMHG | TEMPERATURE: 96.9 F | DIASTOLIC BLOOD PRESSURE: 70 MMHG | WEIGHT: 154 LBS | HEIGHT: 68 IN | BODY MASS INDEX: 23.34 KG/M2

## 2018-05-21 DIAGNOSIS — C50.211 MALIGNANT NEOPLASM OF UPPER-INNER QUADRANT OF RIGHT BREAST IN FEMALE, ESTROGEN RECEPTOR POSITIVE (HCC): Primary | ICD-10-CM

## 2018-05-21 DIAGNOSIS — Z17.0 MALIGNANT NEOPLASM OF UPPER-INNER QUADRANT OF RIGHT BREAST IN FEMALE, ESTROGEN RECEPTOR POSITIVE (HCC): Primary | ICD-10-CM

## 2018-05-21 PROCEDURE — 1123F ACP DISCUSS/DSCN MKR DOCD: CPT | Performed by: SURGERY

## 2018-05-21 PROCEDURE — G8420 CALC BMI NORM PARAMETERS: HCPCS | Performed by: SURGERY

## 2018-05-21 PROCEDURE — 99213 OFFICE O/P EST LOW 20 MIN: CPT | Performed by: SURGERY

## 2018-05-21 PROCEDURE — 3017F COLORECTAL CA SCREEN DOC REV: CPT | Performed by: SURGERY

## 2018-05-21 PROCEDURE — 1090F PRES/ABSN URINE INCON ASSESS: CPT | Performed by: SURGERY

## 2018-05-21 PROCEDURE — G8400 PT W/DXA NO RESULTS DOC: HCPCS | Performed by: SURGERY

## 2018-05-21 PROCEDURE — G8427 DOCREV CUR MEDS BY ELIG CLIN: HCPCS | Performed by: SURGERY

## 2018-05-21 PROCEDURE — 4040F PNEUMOC VAC/ADMIN/RCVD: CPT | Performed by: SURGERY

## 2018-05-21 PROCEDURE — 1036F TOBACCO NON-USER: CPT | Performed by: SURGERY

## 2018-05-22 ENCOUNTER — TELEPHONE (OUTPATIENT)
Dept: SURGERY | Age: 73
End: 2018-05-22

## 2018-05-22 DIAGNOSIS — Z17.0 MALIGNANT NEOPLASM OF UPPER-INNER QUADRANT OF RIGHT BREAST IN FEMALE, ESTROGEN RECEPTOR POSITIVE (HCC): Primary | ICD-10-CM

## 2018-05-22 DIAGNOSIS — C50.211 MALIGNANT NEOPLASM OF UPPER-INNER QUADRANT OF RIGHT BREAST IN FEMALE, ESTROGEN RECEPTOR POSITIVE (HCC): Primary | ICD-10-CM

## 2018-05-23 ENCOUNTER — HOSPITAL ENCOUNTER (OUTPATIENT)
Dept: ULTRASOUND IMAGING | Facility: HOSPITAL | Age: 73
Discharge: HOME | End: 2018-05-23
Admitting: NURSE PRACTITIONER

## 2018-05-23 ENCOUNTER — HOSPITAL ENCOUNTER (OUTPATIENT)
Dept: ULTRASOUND IMAGING | Facility: HOSPITAL | Age: 73
Discharge: HOME | End: 2018-05-23

## 2018-05-23 DIAGNOSIS — N28.1 BILATERAL RENAL CYSTS: ICD-10-CM

## 2018-05-23 DIAGNOSIS — N85.2 ENLARGED UTERUS: ICD-10-CM

## 2018-05-23 PROCEDURE — 76775 US EXAM ABDO BACK WALL LIM: CPT

## 2018-05-23 PROCEDURE — 76856 US EXAM PELVIC COMPLETE: CPT

## 2018-05-29 ENCOUNTER — HOSPITAL ENCOUNTER (OUTPATIENT)
Dept: NON INVASIVE DIAGNOSTICS | Age: 73
Discharge: HOME | End: 2018-05-29
Payer: MEDICARE

## 2018-05-29 LAB
LV EF: 58 %
LVEF MODALITY: NORMAL

## 2018-05-29 PROCEDURE — 93306 TTE W/DOPPLER COMPLETE: CPT

## 2018-06-01 ENCOUNTER — HOSPITAL ENCOUNTER (OUTPATIENT)
Dept: WOMENS IMAGING | Age: 73
Discharge: HOME | End: 2018-06-01
Payer: MEDICARE

## 2018-06-01 ENCOUNTER — HOSPITAL ENCOUNTER (OUTPATIENT)
Dept: MRI IMAGING | Age: 73
Discharge: HOME | End: 2018-06-01
Payer: MEDICARE

## 2018-06-01 DIAGNOSIS — C50.211 MALIGNANT NEOPLASM OF UPPER-INNER QUADRANT OF RIGHT BREAST IN FEMALE, ESTROGEN RECEPTOR POSITIVE (HCC): ICD-10-CM

## 2018-06-01 DIAGNOSIS — Z17.0 MALIGNANT NEOPLASM OF UPPER-INNER QUADRANT OF RIGHT BREAST IN FEMALE, ESTROGEN RECEPTOR POSITIVE (HCC): ICD-10-CM

## 2018-06-01 PROCEDURE — 6360000004 HC RX CONTRAST MEDICATION: Performed by: SURGERY

## 2018-06-01 PROCEDURE — A9577 INJ MULTIHANCE: HCPCS | Performed by: SURGERY

## 2018-06-01 PROCEDURE — 77065 DX MAMMO INCL CAD UNI: CPT

## 2018-06-01 PROCEDURE — 76642 ULTRASOUND BREAST LIMITED: CPT

## 2018-06-01 PROCEDURE — C8908 MRI W/O FOL W/CONT, BREAST,: HCPCS

## 2018-06-01 RX ADMIN — GADOBENATE DIMEGLUMINE 15 ML: 529 INJECTION, SOLUTION INTRAVENOUS at 16:09

## 2018-06-21 ENCOUNTER — OFFICE VISIT (OUTPATIENT)
Dept: SURGERY | Age: 73
End: 2018-06-21
Payer: MEDICARE

## 2018-06-21 VITALS
WEIGHT: 148 LBS | DIASTOLIC BLOOD PRESSURE: 84 MMHG | BODY MASS INDEX: 22.5 KG/M2 | SYSTOLIC BLOOD PRESSURE: 136 MMHG | TEMPERATURE: 96.3 F

## 2018-06-21 DIAGNOSIS — C50.211 MALIGNANT NEOPLASM OF UPPER-INNER QUADRANT OF RIGHT BREAST IN FEMALE, ESTROGEN RECEPTOR POSITIVE (HCC): Primary | ICD-10-CM

## 2018-06-21 DIAGNOSIS — Z17.0 MALIGNANT NEOPLASM OF UPPER-INNER QUADRANT OF RIGHT BREAST IN FEMALE, ESTROGEN RECEPTOR POSITIVE (HCC): Primary | ICD-10-CM

## 2018-06-21 PROCEDURE — 1090F PRES/ABSN URINE INCON ASSESS: CPT | Performed by: SURGERY

## 2018-06-21 PROCEDURE — 1036F TOBACCO NON-USER: CPT | Performed by: SURGERY

## 2018-06-21 PROCEDURE — 1123F ACP DISCUSS/DSCN MKR DOCD: CPT | Performed by: SURGERY

## 2018-06-21 PROCEDURE — G8420 CALC BMI NORM PARAMETERS: HCPCS | Performed by: SURGERY

## 2018-06-21 PROCEDURE — 99213 OFFICE O/P EST LOW 20 MIN: CPT | Performed by: SURGERY

## 2018-06-21 PROCEDURE — 4040F PNEUMOC VAC/ADMIN/RCVD: CPT | Performed by: SURGERY

## 2018-06-21 PROCEDURE — G8427 DOCREV CUR MEDS BY ELIG CLIN: HCPCS | Performed by: SURGERY

## 2018-06-21 PROCEDURE — G8400 PT W/DXA NO RESULTS DOC: HCPCS | Performed by: SURGERY

## 2018-06-21 PROCEDURE — 3017F COLORECTAL CA SCREEN DOC REV: CPT | Performed by: SURGERY

## 2018-06-21 RX ORDER — MONTELUKAST SODIUM 10 MG/1
10 TABLET ORAL NIGHTLY
COMMUNITY
Start: 2018-06-18 | End: 2019-01-17 | Stop reason: SDUPTHER

## 2018-07-02 ENCOUNTER — HOSPITAL ENCOUNTER (OUTPATIENT)
Dept: PREADMISSION TESTING | Age: 73
Discharge: HOME | End: 2018-07-06
Payer: MEDICARE

## 2018-07-02 ENCOUNTER — HOSPITAL ENCOUNTER (OUTPATIENT)
Dept: WOMENS IMAGING | Age: 73
Discharge: HOME | End: 2018-07-02
Payer: MEDICARE

## 2018-07-02 DIAGNOSIS — C50.211 MALIGNANT NEOPLASM OF UPPER-INNER QUADRANT OF RIGHT FEMALE BREAST, UNSPECIFIED ESTROGEN RECEPTOR STATUS (HCC): ICD-10-CM

## 2018-07-02 LAB
ANION GAP SERPL CALCULATED.3IONS-SCNC: 12 MMOL/L (ref 7–19)
BASOPHILS ABSOLUTE: 0 K/UL (ref 0–0.2)
BASOPHILS RELATIVE PERCENT: 0.5 % (ref 0–1)
BUN BLDV-MCNC: 19 MG/DL (ref 8–23)
CALCIUM SERPL-MCNC: 9.5 MG/DL (ref 8.8–10.2)
CHLORIDE BLD-SCNC: 102 MMOL/L (ref 98–111)
CO2: 28 MMOL/L (ref 22–29)
CREAT SERPL-MCNC: 0.7 MG/DL (ref 0.5–0.9)
EOSINOPHILS ABSOLUTE: 0.1 K/UL (ref 0–0.6)
EOSINOPHILS RELATIVE PERCENT: 2.9 % (ref 0–5)
GFR NON-AFRICAN AMERICAN: >60
GLUCOSE BLD-MCNC: 129 MG/DL (ref 74–109)
HCT VFR BLD CALC: 37.8 % (ref 37–47)
HEMOGLOBIN: 11.9 G/DL (ref 12–16)
LYMPHOCYTES ABSOLUTE: 1.1 K/UL (ref 1.1–4.5)
LYMPHOCYTES RELATIVE PERCENT: 26.7 % (ref 20–40)
MCH RBC QN AUTO: 30.9 PG (ref 27–31)
MCHC RBC AUTO-ENTMCNC: 31.5 G/DL (ref 33–37)
MCV RBC AUTO: 98.2 FL (ref 81–99)
MONOCYTES ABSOLUTE: 0.3 K/UL (ref 0–0.9)
MONOCYTES RELATIVE PERCENT: 6.7 % (ref 0–10)
NEUTROPHILS ABSOLUTE: 2.6 K/UL (ref 1.5–7.5)
NEUTROPHILS RELATIVE PERCENT: 63 % (ref 50–65)
PDW BLD-RTO: 12.1 % (ref 11.5–14.5)
PLATELET # BLD: 179 K/UL (ref 130–400)
PMV BLD AUTO: 10 FL (ref 9.4–12.3)
POTASSIUM SERPL-SCNC: 3.9 MMOL/L (ref 3.5–5)
RBC # BLD: 3.85 M/UL (ref 4.2–5.4)
SODIUM BLD-SCNC: 142 MMOL/L (ref 136–145)
WBC # BLD: 4.2 K/UL (ref 4.8–10.8)

## 2018-07-02 PROCEDURE — 85025 COMPLETE CBC W/AUTO DIFF WBC: CPT

## 2018-07-02 PROCEDURE — 80048 BASIC METABOLIC PNL TOTAL CA: CPT

## 2018-07-02 PROCEDURE — 76642 ULTRASOUND BREAST LIMITED: CPT

## 2018-07-03 ENCOUNTER — ANESTHESIA (OUTPATIENT)
Dept: OPERATING ROOM | Age: 73
End: 2018-07-03
Payer: MEDICARE

## 2018-07-03 ENCOUNTER — ANESTHESIA EVENT (OUTPATIENT)
Dept: OPERATING ROOM | Age: 73
End: 2018-07-03
Payer: MEDICARE

## 2018-07-03 ENCOUNTER — HOSPITAL ENCOUNTER (OUTPATIENT)
Dept: NUCLEAR MEDICINE | Age: 73
Discharge: HOME | End: 2018-07-05
Payer: MEDICARE

## 2018-07-03 ENCOUNTER — HOSPITAL ENCOUNTER (OUTPATIENT)
Dept: ULTRASOUND IMAGING | Age: 73
Discharge: HOME | End: 2018-07-03
Payer: MEDICARE

## 2018-07-03 ENCOUNTER — HOSPITAL ENCOUNTER (OUTPATIENT)
Age: 73
Setting detail: OUTPATIENT SURGERY
Discharge: HOME | End: 2018-07-03
Attending: SURGERY | Admitting: SURGERY
Payer: MEDICARE

## 2018-07-03 VITALS
TEMPERATURE: 97.8 F | HEIGHT: 66 IN | RESPIRATION RATE: 22 BRPM | WEIGHT: 148 LBS | SYSTOLIC BLOOD PRESSURE: 142 MMHG | DIASTOLIC BLOOD PRESSURE: 85 MMHG | OXYGEN SATURATION: 100 % | BODY MASS INDEX: 23.78 KG/M2 | HEART RATE: 78 BPM

## 2018-07-03 VITALS
SYSTOLIC BLOOD PRESSURE: 104 MMHG | OXYGEN SATURATION: 96 % | RESPIRATION RATE: 3 BRPM | TEMPERATURE: 96 F | DIASTOLIC BLOOD PRESSURE: 58 MMHG

## 2018-07-03 DIAGNOSIS — C50.211 MALIGNANT NEOPLASM OF UPPER-INNER QUADRANT OF RIGHT BREAST IN FEMALE, ESTROGEN RECEPTOR POSITIVE (HCC): Primary | ICD-10-CM

## 2018-07-03 DIAGNOSIS — Z17.0 MALIGNANT NEOPLASM OF UPPER-INNER QUADRANT OF RIGHT BREAST IN FEMALE, ESTROGEN RECEPTOR POSITIVE (HCC): Primary | ICD-10-CM

## 2018-07-03 PROCEDURE — 99999 PR OFFICE/OUTPT VISIT,PROCEDURE ONLY: CPT | Performed by: SURGERY

## 2018-07-03 PROCEDURE — 2780000010 HC IMPLANT OTHER: Performed by: SURGERY

## 2018-07-03 PROCEDURE — 88361 TUMOR IMMUNOHISTOCHEM/COMPUT: CPT

## 2018-07-03 PROCEDURE — 3600000005 HC SURGERY LEVEL 5 BASE: Performed by: SURGERY

## 2018-07-03 PROCEDURE — 6360000002 HC RX W HCPCS: Performed by: SURGERY

## 2018-07-03 PROCEDURE — 3700000001 HC ADD 15 MINUTES (ANESTHESIA): Performed by: SURGERY

## 2018-07-03 PROCEDURE — 2500000003 HC RX 250 WO HCPCS: Performed by: NURSE ANESTHETIST, CERTIFIED REGISTERED

## 2018-07-03 PROCEDURE — 3600000015 HC SURGERY LEVEL 5 ADDTL 15MIN: Performed by: SURGERY

## 2018-07-03 PROCEDURE — C1729 CATH, DRAINAGE: HCPCS | Performed by: SURGERY

## 2018-07-03 PROCEDURE — 2500000003 HC RX 250 WO HCPCS: Performed by: SURGERY

## 2018-07-03 PROCEDURE — 38900 IO MAP OF SENT LYMPH NODE: CPT | Performed by: SURGERY

## 2018-07-03 PROCEDURE — 6360000002 HC RX W HCPCS: Performed by: NURSE ANESTHETIST, CERTIFIED REGISTERED

## 2018-07-03 PROCEDURE — 2580000003 HC RX 258: Performed by: SURGERY

## 2018-07-03 PROCEDURE — 3430000000 HC RX DIAGNOSTIC RADIOPHARMACEUTICAL: Performed by: SURGERY

## 2018-07-03 PROCEDURE — A4648 IMPLANTABLE TISSUE MARKER: HCPCS | Performed by: SURGERY

## 2018-07-03 PROCEDURE — 19366 PR BREAST RECONSTRUC W OTHR TECHNIQ: CPT | Performed by: SURGERY

## 2018-07-03 PROCEDURE — 7100000011 HC PHASE II RECOVERY - ADDTL 15 MIN: Performed by: SURGERY

## 2018-07-03 PROCEDURE — 7100000010 HC PHASE II RECOVERY - FIRST 15 MIN: Performed by: SURGERY

## 2018-07-03 PROCEDURE — 3700000000 HC ANESTHESIA ATTENDED CARE: Performed by: SURGERY

## 2018-07-03 PROCEDURE — 88374 M/PHMTRC ALYS ISHQUANT/SEMIQ: CPT

## 2018-07-03 PROCEDURE — 38792 RA TRACER ID OF SENTINL NODE: CPT

## 2018-07-03 PROCEDURE — 7100000000 HC PACU RECOVERY - FIRST 15 MIN: Performed by: SURGERY

## 2018-07-03 PROCEDURE — 38525 BIOPSY/REMOVAL LYMPH NODES: CPT | Performed by: SURGERY

## 2018-07-03 PROCEDURE — A9520 TC99 TILMANOCEPT DIAG 0.5MCI: HCPCS | Performed by: SURGERY

## 2018-07-03 PROCEDURE — 6360000002 HC RX W HCPCS: Performed by: ANESTHESIOLOGY

## 2018-07-03 PROCEDURE — 7100000001 HC PACU RECOVERY - ADDTL 15 MIN: Performed by: SURGERY

## 2018-07-03 PROCEDURE — 19285 PERQ DEV BREAST 1ST US IMAG: CPT | Performed by: SURGERY

## 2018-07-03 PROCEDURE — 6370000000 HC RX 637 (ALT 250 FOR IP): Performed by: SURGERY

## 2018-07-03 PROCEDURE — 19301 PARTIAL MASTECTOMY: CPT | Performed by: SURGERY

## 2018-07-03 PROCEDURE — 19340 INSJ BREAST IMPLT SM D MAST: CPT | Performed by: SURGERY

## 2018-07-03 PROCEDURE — 88307 TISSUE EXAM BY PATHOLOGIST: CPT

## 2018-07-03 PROCEDURE — 19285 PERQ DEV BREAST 1ST US IMAG: CPT

## 2018-07-03 DEVICE — MARKER TISS W3XL4CM RADIOGRAPHIC BIOABSRB SPCR HLD RADPQ: Type: IMPLANTABLE DEVICE | Site: BREAST | Status: FUNCTIONAL

## 2018-07-03 RX ORDER — HYDROCODONE BITARTRATE AND ACETAMINOPHEN 5; 325 MG/1; MG/1
1 TABLET ORAL ONCE
Status: COMPLETED | OUTPATIENT
Start: 2018-07-03 | End: 2018-07-03

## 2018-07-03 RX ORDER — HYDROMORPHONE HCL IN 0.9% NACL 0.5 MG/ML
0.5 SYRINGE (ML) INTRAVENOUS EVERY 5 MIN PRN
Status: DISCONTINUED | OUTPATIENT
Start: 2018-07-03 | End: 2018-07-03 | Stop reason: HOSPADM

## 2018-07-03 RX ORDER — ONDANSETRON 2 MG/ML
INJECTION INTRAMUSCULAR; INTRAVENOUS PRN
Status: DISCONTINUED | OUTPATIENT
Start: 2018-07-03 | End: 2018-07-03 | Stop reason: SDUPTHER

## 2018-07-03 RX ORDER — DIPHENHYDRAMINE HYDROCHLORIDE 50 MG/ML
12.5 INJECTION INTRAMUSCULAR; INTRAVENOUS
Status: DISCONTINUED | OUTPATIENT
Start: 2018-07-03 | End: 2018-07-03 | Stop reason: HOSPADM

## 2018-07-03 RX ORDER — SCOLOPAMINE TRANSDERMAL SYSTEM 1 MG/1
1 PATCH, EXTENDED RELEASE TRANSDERMAL ONCE
Status: DISCONTINUED | OUTPATIENT
Start: 2018-07-03 | End: 2018-07-03 | Stop reason: HOSPADM

## 2018-07-03 RX ORDER — MIDAZOLAM HYDROCHLORIDE 1 MG/ML
INJECTION INTRAMUSCULAR; INTRAVENOUS PRN
Status: DISCONTINUED | OUTPATIENT
Start: 2018-07-03 | End: 2018-07-03 | Stop reason: SDUPTHER

## 2018-07-03 RX ORDER — SODIUM CHLORIDE, SODIUM LACTATE, POTASSIUM CHLORIDE, CALCIUM CHLORIDE 600; 310; 30; 20 MG/100ML; MG/100ML; MG/100ML; MG/100ML
INJECTION, SOLUTION INTRAVENOUS CONTINUOUS
Status: DISCONTINUED | OUTPATIENT
Start: 2018-07-03 | End: 2018-07-03 | Stop reason: HOSPADM

## 2018-07-03 RX ORDER — MEPERIDINE HYDROCHLORIDE 50 MG/ML
12.5 INJECTION INTRAMUSCULAR; INTRAVENOUS; SUBCUTANEOUS EVERY 5 MIN PRN
Status: DISCONTINUED | OUTPATIENT
Start: 2018-07-03 | End: 2018-07-03 | Stop reason: HOSPADM

## 2018-07-03 RX ORDER — EPHEDRINE SULFATE 50 MG/ML
INJECTION, SOLUTION INTRAVENOUS PRN
Status: DISCONTINUED | OUTPATIENT
Start: 2018-07-03 | End: 2018-07-03 | Stop reason: SDUPTHER

## 2018-07-03 RX ORDER — METOCLOPRAMIDE HYDROCHLORIDE 5 MG/ML
10 INJECTION INTRAMUSCULAR; INTRAVENOUS
Status: DISCONTINUED | OUTPATIENT
Start: 2018-07-03 | End: 2018-07-03 | Stop reason: HOSPADM

## 2018-07-03 RX ORDER — LIDOCAINE HYDROCHLORIDE 10 MG/ML
1 INJECTION, SOLUTION EPIDURAL; INFILTRATION; INTRACAUDAL; PERINEURAL
Status: DISCONTINUED | OUTPATIENT
Start: 2018-07-03 | End: 2018-07-03 | Stop reason: HOSPADM

## 2018-07-03 RX ORDER — SODIUM CHLORIDE 0.9 % (FLUSH) 0.9 %
10 SYRINGE (ML) INJECTION PRN
Status: DISCONTINUED | OUTPATIENT
Start: 2018-07-03 | End: 2018-07-03 | Stop reason: HOSPADM

## 2018-07-03 RX ORDER — PROPOFOL 10 MG/ML
INJECTION, EMULSION INTRAVENOUS PRN
Status: DISCONTINUED | OUTPATIENT
Start: 2018-07-03 | End: 2018-07-03 | Stop reason: SDUPTHER

## 2018-07-03 RX ORDER — LABETALOL HYDROCHLORIDE 5 MG/ML
5 INJECTION, SOLUTION INTRAVENOUS EVERY 10 MIN PRN
Status: DISCONTINUED | OUTPATIENT
Start: 2018-07-03 | End: 2018-07-03 | Stop reason: HOSPADM

## 2018-07-03 RX ORDER — HYDROMORPHONE HCL IN 0.9% NACL 0.5 MG/ML
0.25 SYRINGE (ML) INTRAVENOUS EVERY 5 MIN PRN
Status: DISCONTINUED | OUTPATIENT
Start: 2018-07-03 | End: 2018-07-03 | Stop reason: HOSPADM

## 2018-07-03 RX ORDER — MIDAZOLAM HYDROCHLORIDE 1 MG/ML
2 INJECTION INTRAMUSCULAR; INTRAVENOUS
Status: COMPLETED | OUTPATIENT
Start: 2018-07-03 | End: 2018-07-03

## 2018-07-03 RX ORDER — MORPHINE SULFATE 10 MG/ML
INJECTION, SOLUTION INTRAMUSCULAR; INTRAVENOUS PRN
Status: DISCONTINUED | OUTPATIENT
Start: 2018-07-03 | End: 2018-07-03 | Stop reason: SDUPTHER

## 2018-07-03 RX ORDER — ISOSULFAN BLUE 50 MG/5ML
INJECTION, SOLUTION SUBCUTANEOUS PRN
Status: DISCONTINUED | OUTPATIENT
Start: 2018-07-03 | End: 2018-07-03 | Stop reason: HOSPADM

## 2018-07-03 RX ORDER — HYDROCODONE BITARTRATE AND ACETAMINOPHEN 5; 325 MG/1; MG/1
1 TABLET ORAL EVERY 6 HOURS PRN
Qty: 20 TABLET | Refills: 0 | Status: SHIPPED | OUTPATIENT
Start: 2018-07-03 | End: 2019-07-03

## 2018-07-03 RX ORDER — SODIUM CHLORIDE 0.9 % (FLUSH) 0.9 %
10 SYRINGE (ML) INJECTION EVERY 12 HOURS SCHEDULED
Status: DISCONTINUED | OUTPATIENT
Start: 2018-07-03 | End: 2018-07-03 | Stop reason: HOSPADM

## 2018-07-03 RX ORDER — FENTANYL CITRATE 50 UG/ML
INJECTION, SOLUTION INTRAMUSCULAR; INTRAVENOUS PRN
Status: DISCONTINUED | OUTPATIENT
Start: 2018-07-03 | End: 2018-07-03 | Stop reason: SDUPTHER

## 2018-07-03 RX ORDER — HYDRALAZINE HYDROCHLORIDE 20 MG/ML
5 INJECTION INTRAMUSCULAR; INTRAVENOUS EVERY 10 MIN PRN
Status: DISCONTINUED | OUTPATIENT
Start: 2018-07-03 | End: 2018-07-03 | Stop reason: HOSPADM

## 2018-07-03 RX ORDER — MORPHINE SULFATE 4 MG/ML
4 INJECTION, SOLUTION INTRAMUSCULAR; INTRAVENOUS
Status: DISCONTINUED | OUTPATIENT
Start: 2018-07-03 | End: 2018-07-03 | Stop reason: HOSPADM

## 2018-07-03 RX ORDER — MORPHINE SULFATE 1 MG/ML
4 INJECTION, SOLUTION EPIDURAL; INTRATHECAL; INTRAVENOUS EVERY 5 MIN PRN
Status: DISCONTINUED | OUTPATIENT
Start: 2018-07-03 | End: 2018-07-03 | Stop reason: HOSPADM

## 2018-07-03 RX ORDER — MORPHINE SULFATE 1 MG/ML
2 INJECTION, SOLUTION EPIDURAL; INTRATHECAL; INTRAVENOUS EVERY 5 MIN PRN
Status: DISCONTINUED | OUTPATIENT
Start: 2018-07-03 | End: 2018-07-03 | Stop reason: HOSPADM

## 2018-07-03 RX ORDER — DEXAMETHASONE SODIUM PHOSPHATE 4 MG/ML
INJECTION, SOLUTION INTRA-ARTICULAR; INTRALESIONAL; INTRAMUSCULAR; INTRAVENOUS; SOFT TISSUE PRN
Status: DISCONTINUED | OUTPATIENT
Start: 2018-07-03 | End: 2018-07-03 | Stop reason: SDUPTHER

## 2018-07-03 RX ORDER — FENTANYL CITRATE 50 UG/ML
50 INJECTION, SOLUTION INTRAMUSCULAR; INTRAVENOUS
Status: COMPLETED | OUTPATIENT
Start: 2018-07-03 | End: 2018-07-03

## 2018-07-03 RX ORDER — PROMETHAZINE HYDROCHLORIDE 25 MG/ML
6.25 INJECTION, SOLUTION INTRAMUSCULAR; INTRAVENOUS
Status: DISCONTINUED | OUTPATIENT
Start: 2018-07-03 | End: 2018-07-03 | Stop reason: HOSPADM

## 2018-07-03 RX ORDER — LIDOCAINE HYDROCHLORIDE 10 MG/ML
INJECTION, SOLUTION INFILTRATION; PERINEURAL PRN
Status: DISCONTINUED | OUTPATIENT
Start: 2018-07-03 | End: 2018-07-03 | Stop reason: SDUPTHER

## 2018-07-03 RX ADMIN — MORPHINE SULFATE 5 MG: 10 INJECTION INTRAMUSCULAR; INTRAVENOUS; SUBCUTANEOUS at 14:53

## 2018-07-03 RX ADMIN — PROPOFOL 150 MG: 10 INJECTION, EMULSION INTRAVENOUS at 13:27

## 2018-07-03 RX ADMIN — MIDAZOLAM 2 MG: 1 INJECTION INTRAMUSCULAR; INTRAVENOUS at 12:23

## 2018-07-03 RX ADMIN — FENTANYL CITRATE 25 MCG: 50 INJECTION, SOLUTION INTRAMUSCULAR; INTRAVENOUS at 13:53

## 2018-07-03 RX ADMIN — Medication 4 MG: at 15:42

## 2018-07-03 RX ADMIN — Medication 4 MG: at 16:00

## 2018-07-03 RX ADMIN — FENTANYL CITRATE 25 MCG: 50 INJECTION, SOLUTION INTRAMUSCULAR; INTRAVENOUS at 14:00

## 2018-07-03 RX ADMIN — ONDANSETRON HYDROCHLORIDE 4 MG: 2 SOLUTION INTRAMUSCULAR; INTRAVENOUS at 15:10

## 2018-07-03 RX ADMIN — TILMANOCEPT 0.5 MILLICURIE: KIT at 14:10

## 2018-07-03 RX ADMIN — LIDOCAINE HYDROCHLORIDE 5 ML: 10 INJECTION, SOLUTION INFILTRATION; PERINEURAL at 13:27

## 2018-07-03 RX ADMIN — HYDROCODONE BITARTRATE AND ACETAMINOPHEN 1 TABLET: 5; 325 TABLET ORAL at 16:42

## 2018-07-03 RX ADMIN — FENTANYL CITRATE 25 MCG: 50 INJECTION, SOLUTION INTRAMUSCULAR; INTRAVENOUS at 13:38

## 2018-07-03 RX ADMIN — FENTANYL CITRATE 50 MCG: 50 INJECTION, SOLUTION INTRAMUSCULAR; INTRAVENOUS at 16:04

## 2018-07-03 RX ADMIN — SODIUM CHLORIDE, SODIUM LACTATE, POTASSIUM CHLORIDE, AND CALCIUM CHLORIDE: 600; 310; 30; 20 INJECTION, SOLUTION INTRAVENOUS at 10:59

## 2018-07-03 RX ADMIN — DEXAMETHASONE SODIUM PHOSPHATE 4 MG: 4 INJECTION, SOLUTION INTRAMUSCULAR; INTRAVENOUS at 13:32

## 2018-07-03 RX ADMIN — Medication 2 G: at 13:44

## 2018-07-03 RX ADMIN — MIDAZOLAM HYDROCHLORIDE 2 MG: 1 INJECTION, SOLUTION INTRAMUSCULAR; INTRAVENOUS at 13:20

## 2018-07-03 RX ADMIN — EPHEDRINE SULFATE 5 MG: 50 INJECTION, SOLUTION INTRAMUSCULAR; INTRAVENOUS; SUBCUTANEOUS at 13:36

## 2018-07-03 RX ADMIN — MORPHINE SULFATE 5 MG: 10 INJECTION INTRAMUSCULAR; INTRAVENOUS; SUBCUTANEOUS at 14:40

## 2018-07-03 RX ADMIN — FENTANYL CITRATE 25 MCG: 50 INJECTION, SOLUTION INTRAMUSCULAR; INTRAVENOUS at 13:57

## 2018-07-03 RX ADMIN — Medication 0.5 MG: at 16:40

## 2018-07-03 RX ADMIN — SODIUM CHLORIDE, SODIUM LACTATE, POTASSIUM CHLORIDE, AND CALCIUM CHLORIDE: 600; 310; 30; 20 INJECTION, SOLUTION INTRAVENOUS at 15:03

## 2018-07-03 ASSESSMENT — PAIN SCALES - GENERAL
PAINLEVEL_OUTOF10: 0
PAINLEVEL_OUTOF10: 9
PAINLEVEL_OUTOF10: 9
PAINLEVEL_OUTOF10: 10
PAINLEVEL_OUTOF10: 0
PAINLEVEL_OUTOF10: 10
PAINLEVEL_OUTOF10: 0
PAINLEVEL_OUTOF10: 7

## 2018-07-03 ASSESSMENT — PAIN DESCRIPTION - ORIENTATION: ORIENTATION: RIGHT

## 2018-07-03 ASSESSMENT — PAIN DESCRIPTION - ONSET: ONSET: ON-GOING

## 2018-07-03 ASSESSMENT — PAIN DESCRIPTION - DESCRIPTORS: DESCRIPTORS: BURNING;SHARP

## 2018-07-03 ASSESSMENT — PAIN DESCRIPTION - FREQUENCY: FREQUENCY: CONTINUOUS

## 2018-07-03 ASSESSMENT — LIFESTYLE VARIABLES: SMOKING_STATUS: 0

## 2018-07-03 ASSESSMENT — PAIN DESCRIPTION - PROGRESSION: CLINICAL_PROGRESSION: RAPIDLY WORSENING

## 2018-07-03 ASSESSMENT — PAIN DESCRIPTION - LOCATION: LOCATION: BREAST

## 2018-07-03 ASSESSMENT — PAIN DESCRIPTION - PAIN TYPE
TYPE: SURGICAL PAIN
TYPE: ACUTE PAIN

## 2018-07-03 NOTE — H&P
HISTORY OF PRESENT ILLNESS:     Ms. Amara Courtney  is status post ultrasound guided breast biopsy  on the right which revealed a 3.2  cm low grade invasive ductal carcinoma. ER/CO strongly positive. CO was strongly positive. HER-2 was negative by IHC. Ki-67 was 11% and borderline.     Delayed results demonstrated HER-2 as positive by FISH. Unfortunately the HER-2 positive by FISH was not available at that time and after discussion with Dr. Leretha Goltz, we will need to see her back for consideration of neoadjuvant chemotherapy instead.     She has completed her neoadjuvant chemotherapy with Herceptin, Perjeta, Taxotere, and carboplatin. She has noticed a marked decrease in the palpable abnormality in her breast. She received her last chemotherapy in mid May.     She has now completed her chemotherapy and is doing well.       Impression   1.. Spiculated lesion within the right breast mid depth which is a   known breast neoplasm. When compared to the previous study I do feel   it has shown some diminishment in size suggesting response to therapy. It is difficult to directly correlate to the previous mammogram as the   prior mammograms do not have available tomosynthesis images. We will   also obtain an ultrasound which may be more accurate to assess   response to therapy. No new lesions are present. 2. ACR BI-RADS category 6. Known neoplasm.      Narrative   EXAMINATION: Limited right breast ultrasound 6/1/2018   HISTORY: Malignant neoplasm upper inner quadrant right breast. Assess   response to treatment   FINDINGS: Today's exam is compared to previous study of 3/19/2018. At   the 12:00 position there is again noted to be architectural distortion   as well as residual mass. There is an adjacent clip postbiopsy. The   soft tissue component of the mass measures approximately 1.2 x 1.7 x   0.7 cm on today's exam previously measured at 3.1 x 1.3 x 1.7 cm in   size suggesting continued response to therapy.    The patient is also complaining of a palpable abnormality within the   right breast laterally at the 9 to 10:00 position 9 cm from the   nipple. There is a band of breast parenchyma in this area but without   evidence of a discrete focal mass or fibrocystic change.       Impression   1.. No sonographic correlate for the patient's noted area of palpable   normality within the lateral right breast 9 cm from the nipple. 2. Continued response of the patient's neoplasm to therapy. 3. BI-RADS 6. Known neoplasm.      Narrative   EXAMINATION:  MRI BREAST BILATERAL WITHOUT AND WITH CONTRAST  6/1/2018   4:55 PM   HISTORY: Recent history of right breast cancer diagnosis. The patient   has had chemotherapy. There is a family history of breast cancer in   the patient's mother. COMPARISON: 12/14/2017. TECHNIQUE: Multiplanar imaging was performed in a high field magnet   before and after gadolinium contrast administration. The CAD stream   protocol was utilized for computer aided detection. BACKGROUND PARENCHYMAL ENHANCEMENT: None. FINDINGS: There is an enhancing residual mass in the upper right   breast around the 12-1:00 location measuring 2 x 1.6 cm and previously   measuring 2.1 x 2.5 cm in the same dimensions and measured on the   sagittal postcontrast sequence. The lesion enhances rapidly with   washout kinetics and enhances up to 100%. Therefore, there has been a   response to chemotherapy. There are no new masses. There are no   abnormally enlarged lymph nodes identified in either axilla.       Impression   1. The mass in the right breast is smaller with size measurements   listed above. The findings are consistent with a response to   chemotherapy. 2. There are no new masses.  There are no enlarged lymph nodes.      Alicia Angela is a 67 y.o. female with the [facility]:       Patient Active Problem List     Diagnosis Date Noted    Malignant neoplasm of upper-inner quadrant of right breast

## 2018-07-03 NOTE — ANESTHESIA PRE PROCEDURE
98.2 07/02/2018    RDW 12.1 07/02/2018     07/02/2018       CMP:   Lab Results   Component Value Date     07/02/2018    K 3.9 07/02/2018     07/02/2018    CO2 28 07/02/2018    BUN 19 07/02/2018    CREATININE 0.7 07/02/2018    LABGLOM >60 07/02/2018    GLUCOSE 129 07/02/2018    CALCIUM 9.5 07/02/2018       POC Tests: No results for input(s): POCGLU, POCNA, POCK, POCCL, POCBUN, POCHEMO, POCHCT in the last 72 hours. Coags: No results found for: PROTIME, INR, APTT    HCG (If Applicable): No results found for: PREGTESTUR, PREGSERUM, HCG, HCGQUANT     ABGs: No results found for: PHART, PO2ART, YDZ4QHN, NKT3EHS, BEART, J7UJHQIC     Type & Screen (If Applicable):  No results found for: LABABO, [address]    Anesthesia Evaluation  Patient summary reviewed and Nursing notes reviewed history of anesthetic complications (prolonged emergence): Airway: Mallampati: II  TM distance: >3 FB   Neck ROM: full  Mouth opening: > = 3 FB Dental:          Pulmonary:normal exam        (-) not a current smoker                           Cardiovascular:    (+) hyperlipidemia                  Neuro/Psych:   Negative Neuro/Psych ROS              GI/Hepatic/Renal:        (-) GERD, liver disease and no renal disease       Endo/Other:    (+) : arthritis:., .    (-) diabetes mellitus               Abdominal:           Vascular: negative vascular ROS. Anesthesia Plan      general     ASA 2       Induction: intravenous. MIPS: Postoperative opioids intended and Prophylactic antiemetics administered. Anesthetic plan and risks discussed with patient. Plan discussed with CRNA.                   Alexandru Esposito MD   7/3/2018

## 2018-07-03 NOTE — BRIEF OP NOTE
Brief Postoperative Note      DATE OF PROCEDURE: 7/3/2018     SURGEON: Jagjit Saez MD    PREOPERATIVE DIAGNOSIS: C50.211    POSTOPERATIVE DIAGNOSIS: Same     OPERATION: Procedure(s):  LUMPECTOMY W/SNB AND INTRAOP US GUIDED NL    ANESTHESIA: General    ESTIMATED BLOOD LOSS: Minimal    COMPLICATIONS: None. SPECIMENS:   ID Type Source Tests Collected by Time Destination   A : right breast tissue Tissue Breast SURGICAL PATHOLOGY Jagjit Saez MD 7/3/2018 1356    B : right breast tissue-additional caudal-medial margins Tissue Breast SURGICAL PATHOLOGY Jagjit Saez MD 7/3/2018 1424    C : right breast tissue-additional deep margins Tissue Breast SURGICAL PATHOLOGY Jagjit Saez MD 7/3/2018 1425    D : right sentinel node Tissue Breast SURGICAL PATHOLOGY Jagjit Saez MD 7/3/2018 1426        DRAINS: DAKOTA    The patient tolerated the procedure well.     Electronically signed by Jagjit Saez MD  on 7/3/2018 at 3:00 PM

## 2018-07-04 LAB
EKG P AXIS: 58 DEGREES
EKG P-R INTERVAL: 130 MS
EKG Q-T INTERVAL: 388 MS
EKG QRS DURATION: 88 MS
EKG QTC CALCULATION (BAZETT): 395 MS
EKG T AXIS: 21 DEGREES

## 2018-07-04 NOTE — OP NOTE
reverse mapping. We  then utilized the NeoProbe, approached the right axilla and identified a  hot area of lymph node tissue. We made a curvilinear incision and  dissected down into the breast parenchyma and identified the single hot  area of lymph node tissue that was not blue stained. We grasped with Allis  clamp and excised it using the Bovie and hemoclips. The node was grossly  not involved with tumor, it was hot at about 300 to 400 counts. There was  no significant residual radiation in the axilla. We then irrigated  copiously, obtained good hemostasis, closed the subcu with 2-0 and 3-0  Vicryl and 4-0 Monocryl for the skin. We then turned to the breast defect. We placed a 3 x 4 cm BioZorb radiation marker and tissue filler scaffold in  the defect created by our excision and sutured into place with 3-0 Vicryl. We then reapproximated the breast parenchyma over this and around this,  which fit quite nicely. We then placed a large round Felice-Jones drain  in the subcu and then closed the subcu with 2-0 and 3-0 Vicryl and the skin  with 4-0 Monocryl. Sterile dressings were applied. I reviewed the  specimen grossly with pathology. There was some residual tumor. Our  margins were all grossly excellent. Estimated blood loss, minimal.   Complications, none. She tolerated all this quite well.         Delphine Brooke MD    D: 07/03/2018 16:51:40      T: 07/04/2018 0:00:16     [id number]  Job#: [id number]     Doc#: [id number]    CC:

## 2018-07-11 ENCOUNTER — OFFICE VISIT (OUTPATIENT)
Dept: SURGERY | Age: 73
End: 2018-07-11

## 2018-07-11 VITALS
DIASTOLIC BLOOD PRESSURE: 76 MMHG | TEMPERATURE: 98.3 F | BODY MASS INDEX: 23.4 KG/M2 | HEART RATE: 76 BPM | WEIGHT: 145 LBS | SYSTOLIC BLOOD PRESSURE: 118 MMHG

## 2018-07-11 DIAGNOSIS — C50.211 MALIGNANT NEOPLASM OF UPPER-INNER QUADRANT OF RIGHT BREAST IN FEMALE, ESTROGEN RECEPTOR POSITIVE (HCC): Primary | ICD-10-CM

## 2018-07-11 DIAGNOSIS — Z98.890 S/P LUMPECTOMY, RIGHT BREAST: ICD-10-CM

## 2018-07-11 DIAGNOSIS — Z17.0 MALIGNANT NEOPLASM OF UPPER-INNER QUADRANT OF RIGHT BREAST IN FEMALE, ESTROGEN RECEPTOR POSITIVE (HCC): Primary | ICD-10-CM

## 2018-07-11 PROCEDURE — 99024 POSTOP FOLLOW-UP VISIT: CPT | Performed by: SURGERY

## 2018-07-11 NOTE — PROGRESS NOTES
HISTORY OF PRESENT ILLNESS:  Ms. Stephanie Kumar  is a 67 y.o.   female   who is status post right lumpectomy and sentinel node biopsy on 7/3/2018. This was following neoadjuvant chemotherapy for a 3.2  cm low grade invasive ductal carcinoma. ER/MT strongly positive. MT was strongly positive. HER-2 was negative by IHC. Ki-67 was 11% and borderline.     Delayed results demonstrated HER-2 as positive by FISH. Unfortunately the HER-2 positive by FISH was not available at that time and after discussion with Dr. Jose Hubbard, we will need to see her back for consideration of neoadjuvant chemotherapy instead. Repeat HER-2 on her surgical specimen was indeterminate. PATHOLOGY REVEALS:  FINAL DIAGNOSIS:       A. Right breast, lumpectomy with sentinel lymph node biopsy and       ultrasound-guided wire localization:       Invasive ductal carcinoma, grade 1.       Greatest dimension of the residual invasive carcinoma is 1.9 cm.       Invasive carcinoma approaches to the closest anterior caudal margin       at 1 mm.       Ductal carcinoma in situ is present, cribriform and solid patterns,       nuclear grade 1.       The ductal carcinoma in situ approaches to the closest inferior       caudal margin at 10 mm.       B. Right breast, excision of additional caudal and medial margins:       Benign breast tissue.       C. Right breast, excision of additional deep margins:       Benign skeletal muscle and adipose tissue.       D. Lymph node, right axilla, sentinel lymph node biopsy: Benign lymph       node with fatty infiltration and partial hyalinization.        Pathologic AJCC classification: ypT1c ypN0. PHYSICAL EXAM:  The  wounds look good with no evidence of infection, fluid accumulation, or skin necrosis. DAKOTA drain was removed without incident      IMPRESSION:    Doing well s/p  right lumpectomy and sentinel node biopsy. PLAN:  Nicole Walton will see her back in 1 week for fluid check.  I will see her back in 3

## 2018-07-18 ENCOUNTER — OFFICE VISIT (OUTPATIENT)
Dept: SURGERY | Age: 73
End: 2018-07-18

## 2018-07-18 VITALS — SYSTOLIC BLOOD PRESSURE: 120 MMHG | HEART RATE: 72 BPM | DIASTOLIC BLOOD PRESSURE: 78 MMHG

## 2018-07-18 DIAGNOSIS — Z98.890 S/P LUMPECTOMY, RIGHT BREAST: Primary | ICD-10-CM

## 2018-07-18 PROCEDURE — 99024 POSTOP FOLLOW-UP VISIT: CPT | Performed by: PHYSICIAN ASSISTANT

## 2018-08-01 ENCOUNTER — OFFICE VISIT (OUTPATIENT)
Dept: SURGERY | Age: 73
End: 2018-08-01

## 2018-08-01 VITALS
HEART RATE: 76 BPM | WEIGHT: 146 LBS | HEIGHT: 68 IN | TEMPERATURE: 96.6 F | SYSTOLIC BLOOD PRESSURE: 112 MMHG | DIASTOLIC BLOOD PRESSURE: 64 MMHG | BODY MASS INDEX: 22.13 KG/M2

## 2018-08-01 DIAGNOSIS — C50.211 MALIGNANT NEOPLASM OF UPPER-INNER QUADRANT OF RIGHT BREAST IN FEMALE, ESTROGEN RECEPTOR POSITIVE (HCC): Primary | ICD-10-CM

## 2018-08-01 DIAGNOSIS — Z98.890 S/P LUMPECTOMY, RIGHT BREAST: ICD-10-CM

## 2018-08-01 DIAGNOSIS — Z17.0 MALIGNANT NEOPLASM OF UPPER-INNER QUADRANT OF RIGHT BREAST IN FEMALE, ESTROGEN RECEPTOR POSITIVE (HCC): Primary | ICD-10-CM

## 2018-08-01 PROCEDURE — 99024 POSTOP FOLLOW-UP VISIT: CPT | Performed by: SURGERY

## 2018-08-01 RX ORDER — ANASTROZOLE 1 MG/1
1 TABLET ORAL DAILY
COMMUNITY
End: 2020-04-02

## 2018-08-10 ENCOUNTER — TELEPHONE (OUTPATIENT)
Dept: SURGERY | Age: 73
End: 2018-08-10

## 2018-10-10 ENCOUNTER — OFFICE VISIT (OUTPATIENT)
Dept: SURGERY | Age: 73
End: 2018-10-10
Payer: MEDICARE

## 2018-10-10 VITALS
DIASTOLIC BLOOD PRESSURE: 82 MMHG | BODY MASS INDEX: 22.82 KG/M2 | HEART RATE: 76 BPM | SYSTOLIC BLOOD PRESSURE: 110 MMHG | HEIGHT: 66 IN | WEIGHT: 142 LBS

## 2018-10-10 DIAGNOSIS — C50.211 MALIGNANT NEOPLASM OF UPPER-INNER QUADRANT OF RIGHT BREAST IN FEMALE, ESTROGEN RECEPTOR POSITIVE (HCC): ICD-10-CM

## 2018-10-10 DIAGNOSIS — Z98.890 S/P LUMPECTOMY, RIGHT BREAST: Primary | ICD-10-CM

## 2018-10-10 DIAGNOSIS — Z17.0 MALIGNANT NEOPLASM OF UPPER-INNER QUADRANT OF RIGHT BREAST IN FEMALE, ESTROGEN RECEPTOR POSITIVE (HCC): ICD-10-CM

## 2018-10-10 PROCEDURE — G8484 FLU IMMUNIZE NO ADMIN: HCPCS | Performed by: SURGERY

## 2018-10-10 PROCEDURE — G8427 DOCREV CUR MEDS BY ELIG CLIN: HCPCS | Performed by: SURGERY

## 2018-10-10 PROCEDURE — 1123F ACP DISCUSS/DSCN MKR DOCD: CPT | Performed by: SURGERY

## 2018-10-10 PROCEDURE — G8400 PT W/DXA NO RESULTS DOC: HCPCS | Performed by: SURGERY

## 2018-10-10 PROCEDURE — 1036F TOBACCO NON-USER: CPT | Performed by: SURGERY

## 2018-10-10 PROCEDURE — 4040F PNEUMOC VAC/ADMIN/RCVD: CPT | Performed by: SURGERY

## 2018-10-10 PROCEDURE — 1090F PRES/ABSN URINE INCON ASSESS: CPT | Performed by: SURGERY

## 2018-10-10 PROCEDURE — 3017F COLORECTAL CA SCREEN DOC REV: CPT | Performed by: SURGERY

## 2018-10-10 PROCEDURE — 1101F PT FALLS ASSESS-DOCD LE1/YR: CPT | Performed by: SURGERY

## 2018-10-10 PROCEDURE — G8420 CALC BMI NORM PARAMETERS: HCPCS | Performed by: SURGERY

## 2018-10-10 PROCEDURE — 99213 OFFICE O/P EST LOW 20 MIN: CPT | Performed by: SURGERY

## 2018-10-10 NOTE — PROGRESS NOTES
HISTORY OF PRESENT ILLNESS:  Ms. Malika Juarez  is a 68 y.o.   female   who is status post right lumpectomy and sentinel node biopsy on 7/3/2018. This was following neoadjuvant chemotherapy for a 3.2  cm low grade invasive ductal carcinoma. ER/NJ strongly positive. NJ was strongly positive. HER-2 was negative by IHC. Ki-67 was 11% and borderline.     Delayed results demonstrated HER-2 as positive by FISH. Unfortunately the HER-2 positive by FISH was not available at that time and after discussion with Dr. Ethan Nelson, we will need to see her back for consideration of neoadjuvant chemotherapy instead. Repeat HER-2 on her surgical specimen was indeterminate. PATHOLOGY REVEALS:  FINAL DIAGNOSIS:       A. Right breast, lumpectomy with sentinel lymph node biopsy and       ultrasound-guided wire localization:       Invasive ductal carcinoma, grade 1.       Greatest dimension of the residual invasive carcinoma is 1.9 cm.       Invasive carcinoma approaches to the closest anterior caudal margin       at 1 mm.       Ductal carcinoma in situ is present, cribriform and solid patterns,       nuclear grade 1.       The ductal carcinoma in situ approaches to the closest inferior       caudal margin at 10 mm.       B. Right breast, excision of additional caudal and medial margins:       Benign breast tissue.       C. Right breast, excision of additional deep margins:       Benign skeletal muscle and adipose tissue.       D. Lymph node, right axilla, sentinel lymph node biopsy: Benign lymph       node with fatty infiltration and partial hyalinization.        Pathologic AJCC classification: ypT1c ypN0. She has now completed her radiation therapy. She is really doing very well. PHYSICAL EXAM:  The  wounds look good with no evidence of infection, fluid accumulation, or skin necrosis. IMPRESSION:    Doing well s/p  right lumpectomy and sentinel node biopsy.     PLAN:  I will plan to see her back in January

## 2018-10-12 DIAGNOSIS — Z17.0 MALIGNANT NEOPLASM OF UPPER-INNER QUADRANT OF RIGHT BREAST IN FEMALE, ESTROGEN RECEPTOR POSITIVE (HCC): Primary | ICD-10-CM

## 2018-10-12 DIAGNOSIS — C50.211 MALIGNANT NEOPLASM OF UPPER-INNER QUADRANT OF RIGHT BREAST IN FEMALE, ESTROGEN RECEPTOR POSITIVE (HCC): Primary | ICD-10-CM

## 2018-10-12 DIAGNOSIS — Z98.890 S/P LUMPECTOMY, RIGHT BREAST: ICD-10-CM

## 2018-12-12 ENCOUNTER — TELEPHONE (OUTPATIENT)
Dept: SURGERY | Age: 73
End: 2018-12-12

## 2019-01-17 ENCOUNTER — HOSPITAL ENCOUNTER (OUTPATIENT)
Dept: WOMENS IMAGING | Age: 74
Discharge: HOME | End: 2019-01-17
Payer: MEDICARE

## 2019-01-17 ENCOUNTER — OFFICE VISIT (OUTPATIENT)
Dept: SURGERY | Age: 74
End: 2019-01-17
Payer: MEDICARE

## 2019-01-17 VITALS — SYSTOLIC BLOOD PRESSURE: 124 MMHG | DIASTOLIC BLOOD PRESSURE: 80 MMHG | HEART RATE: 72 BPM

## 2019-01-17 DIAGNOSIS — C50.211 MALIGNANT NEOPLASM OF UPPER-INNER QUADRANT OF RIGHT BREAST IN FEMALE, ESTROGEN RECEPTOR POSITIVE (HCC): ICD-10-CM

## 2019-01-17 DIAGNOSIS — C50.211 MALIGNANT NEOPLASM OF UPPER-INNER QUADRANT OF RIGHT BREAST IN FEMALE, ESTROGEN RECEPTOR POSITIVE (HCC): Primary | ICD-10-CM

## 2019-01-17 DIAGNOSIS — Z17.0 MALIGNANT NEOPLASM OF UPPER-INNER QUADRANT OF RIGHT BREAST IN FEMALE, ESTROGEN RECEPTOR POSITIVE (HCC): ICD-10-CM

## 2019-01-17 DIAGNOSIS — Z17.0 MALIGNANT NEOPLASM OF UPPER-INNER QUADRANT OF RIGHT BREAST IN FEMALE, ESTROGEN RECEPTOR POSITIVE (HCC): Primary | ICD-10-CM

## 2019-01-17 DIAGNOSIS — Z98.890 S/P LUMPECTOMY, RIGHT BREAST: ICD-10-CM

## 2019-01-17 PROCEDURE — 4040F PNEUMOC VAC/ADMIN/RCVD: CPT | Performed by: SURGERY

## 2019-01-17 PROCEDURE — G8484 FLU IMMUNIZE NO ADMIN: HCPCS | Performed by: SURGERY

## 2019-01-17 PROCEDURE — G8420 CALC BMI NORM PARAMETERS: HCPCS | Performed by: SURGERY

## 2019-01-17 PROCEDURE — G0279 TOMOSYNTHESIS, MAMMO: HCPCS

## 2019-01-17 PROCEDURE — 1123F ACP DISCUSS/DSCN MKR DOCD: CPT | Performed by: SURGERY

## 2019-01-17 PROCEDURE — G8400 PT W/DXA NO RESULTS DOC: HCPCS | Performed by: SURGERY

## 2019-01-17 PROCEDURE — 1101F PT FALLS ASSESS-DOCD LE1/YR: CPT | Performed by: SURGERY

## 2019-01-17 PROCEDURE — 1090F PRES/ABSN URINE INCON ASSESS: CPT | Performed by: SURGERY

## 2019-01-17 PROCEDURE — 3017F COLORECTAL CA SCREEN DOC REV: CPT | Performed by: SURGERY

## 2019-01-17 PROCEDURE — G8427 DOCREV CUR MEDS BY ELIG CLIN: HCPCS | Performed by: SURGERY

## 2019-01-17 PROCEDURE — 1036F TOBACCO NON-USER: CPT | Performed by: SURGERY

## 2019-01-17 PROCEDURE — 99213 OFFICE O/P EST LOW 20 MIN: CPT | Performed by: SURGERY

## 2019-01-17 RX ORDER — GABAPENTIN 100 MG/1
100 CAPSULE ORAL DAILY
COMMUNITY

## 2019-01-17 RX ORDER — MONTELUKAST SODIUM 10 MG/1
10 TABLET ORAL NIGHTLY
Qty: 30 TABLET | Refills: 5 | Status: SHIPPED | OUTPATIENT
Start: 2019-01-17 | End: 2022-01-27

## 2019-04-22 ENCOUNTER — HOSPITAL ENCOUNTER (OUTPATIENT)
Dept: WOMENS IMAGING | Age: 74
Discharge: HOME | End: 2019-04-22
Payer: MEDICARE

## 2019-04-22 ENCOUNTER — OFFICE VISIT (OUTPATIENT)
Dept: SURGERY | Age: 74
End: 2019-04-22
Payer: MEDICARE

## 2019-04-22 VITALS — SYSTOLIC BLOOD PRESSURE: 120 MMHG | HEART RATE: 72 BPM | DIASTOLIC BLOOD PRESSURE: 70 MMHG

## 2019-04-22 DIAGNOSIS — Z98.890 S/P LUMPECTOMY, RIGHT BREAST: Primary | ICD-10-CM

## 2019-04-22 DIAGNOSIS — Z98.890 S/P LUMPECTOMY, RIGHT BREAST: ICD-10-CM

## 2019-04-22 DIAGNOSIS — Z17.0 MALIGNANT NEOPLASM OF UPPER-INNER QUADRANT OF RIGHT BREAST IN FEMALE, ESTROGEN RECEPTOR POSITIVE (HCC): ICD-10-CM

## 2019-04-22 DIAGNOSIS — C50.211 MALIGNANT NEOPLASM OF UPPER-INNER QUADRANT OF RIGHT BREAST IN FEMALE, ESTROGEN RECEPTOR POSITIVE (HCC): ICD-10-CM

## 2019-04-22 PROCEDURE — 99213 OFFICE O/P EST LOW 20 MIN: CPT | Performed by: SURGERY

## 2019-04-22 PROCEDURE — 4040F PNEUMOC VAC/ADMIN/RCVD: CPT | Performed by: SURGERY

## 2019-04-22 PROCEDURE — G8420 CALC BMI NORM PARAMETERS: HCPCS | Performed by: SURGERY

## 2019-04-22 PROCEDURE — 1123F ACP DISCUSS/DSCN MKR DOCD: CPT | Performed by: SURGERY

## 2019-04-22 PROCEDURE — G0279 TOMOSYNTHESIS, MAMMO: HCPCS

## 2019-04-22 PROCEDURE — 3017F COLORECTAL CA SCREEN DOC REV: CPT | Performed by: SURGERY

## 2019-04-22 PROCEDURE — 1036F TOBACCO NON-USER: CPT | Performed by: SURGERY

## 2019-04-22 PROCEDURE — G8428 CUR MEDS NOT DOCUMENT: HCPCS | Performed by: SURGERY

## 2019-04-22 PROCEDURE — 1090F PRES/ABSN URINE INCON ASSESS: CPT | Performed by: SURGERY

## 2019-04-22 PROCEDURE — G8400 PT W/DXA NO RESULTS DOC: HCPCS | Performed by: SURGERY

## 2019-04-22 NOTE — PROGRESS NOTES
HISTORY OF PRESENT ILLNESS:  Ms. Zhao Beckman  is a 68 y.o.   female   who is status post right lumpectomy and sentinel node biopsy on 7/3/2018. This was following neoadjuvant chemotherapy for a 3.2  cm low grade invasive ductal carcinoma. ER/PA strongly positive. PA was strongly positive. HER-2 was negative by IHC. Ki-67 was 11% and borderline.     Delayed results demonstrated HER-2 as positive by FISH. Unfortunately the HER-2 positive by FISH was not available at that time and after discussion with Dr. Ysabel Figueroa, we will saw her back for consideration of neoadjuvant chemotherapy instead. Repeat HER-2 on her surgical specimen was indeterminate. PATHOLOGY REVEALS:  FINAL DIAGNOSIS:       A. Right breast, lumpectomy with sentinel lymph node biopsy and       ultrasound-guided wire localization:       Invasive ductal carcinoma, grade 1.       Greatest dimension of the residual invasive carcinoma is 1.9 cm.       Invasive carcinoma approaches to the closest anterior caudal margin       at 1 mm.       Ductal carcinoma in situ is present, cribriform and solid patterns,       nuclear grade 1.       The ductal carcinoma in situ approaches to the closest inferior       caudal margin at 10 mm.       B. Right breast, excision of additional caudal and medial margins:       Benign breast tissue.       C. Right breast, excision of additional deep margins:       Benign skeletal muscle and adipose tissue.       D. Lymph node, right axilla, sentinel lymph node biopsy: Benign lymph       node with fatty infiltration and partial hyalinization.        Pathologic AJCC classification: ypT1c ypN0. She has now completed her radiation therapy. She is really doing very well. Bilateral digital mammogram-1/17/2019  Impression   Impression:    1.   No mammographic evidence of malignancy in the left breast.   2.  8 mm asymmetry in the posterior depth of the right breast,   slightly remote from the surgical bed.  This is probably benign as it   is not confirmed on additional images. BI-RADS Final Assessment   Category 3: Probably benign. 3. Recommend follow-up diagnostic mammogram in 3 months with spot   compression views and possible ultrasound. Right mammogram-4/22/2019  Narrative   EXAMINATION: [facility] DIGITAL DIAGNOSTIC UNILATERAL RIGHT 4/22/2019 12:41   PM   HISTORY: 68-year-old female with a personal history of right breast   carcinoma, previous lumpectomy returns for three-month follow-up of an   asymmetric density in the deep inferior right breast only well seen on   oblique images. Report: Today's examination consists of craniocaudal, oblique and true   lateral views of the right breast with 3-D kailee synthesis, as well as   a focal spot compression view of the right breast in the oblique   dimension. Comparison is made with the diagnostic mammograms   1/17/2019. The breast parenchyma is moderately dense, in a Pattern C parenchymal   pattern, which may lower the sensitivity of the study. There are   stable post therapeutic changes in the right breast at 11-12 o'clock,   the asymmetry seen in the deep inferior right breast on previous   diagnostic mammograms is not visualized. No new mass or suspicious   findings are identified. The patient is due to return for bilateral   mammograms in 9 months, January, 2020.       Impression   No suspicious findings are seen in the right breast, there   are post therapeutic changes as before. The patient is due to return   for bilateral mammograms in 9 months, January, 2020. BI-RADS Category   2, benign. She is wanting to get her port out. We will do this at her convenience. PHYSICAL EXAM:  The right lumpectomy incision is looking good. She has fibrocystic changes bilaterally and appropriate postoperative scarring on the right. There are no dominant masses, no skin or nipple changes, and no axillary adenopathy. There is no evidence of new or recurrent neoplasm. IMPRESSION:    Doing well s/p  right lumpectomy and sentinel node biopsy 7/3/2018. PLAN:  I will plan to see her back in September with physical exam. She will call if anything changes. Port removal at [facility] at her convenience. I spent over 50% of this visit counseling patient. 15 minutes of face to face time with patient.

## 2019-04-23 ENCOUNTER — TELEPHONE (OUTPATIENT)
Dept: SURGERY | Age: 74
End: 2019-04-23

## 2019-04-23 NOTE — TELEPHONE ENCOUNTER
I left a message asking pt to call back to schedule port removal.  Pt informed I will be out of the office 4/25-4/26.

## 2019-05-13 ENCOUNTER — ANESTHESIA EVENT (OUTPATIENT)
Dept: OPERATING ROOM | Age: 74
End: 2019-05-13

## 2019-05-16 ENCOUNTER — HOSPITAL ENCOUNTER (OUTPATIENT)
Age: 74
Setting detail: OUTPATIENT SURGERY
Discharge: HOME | End: 2019-05-16
Attending: SURGERY | Admitting: SURGERY
Payer: MEDICARE

## 2019-05-16 ENCOUNTER — ANESTHESIA (OUTPATIENT)
Dept: OPERATING ROOM | Age: 74
End: 2019-05-16

## 2019-05-16 VITALS — DIASTOLIC BLOOD PRESSURE: 55 MMHG | SYSTOLIC BLOOD PRESSURE: 87 MMHG | OXYGEN SATURATION: 99 %

## 2019-05-16 VITALS
HEART RATE: 55 BPM | OXYGEN SATURATION: 98 % | SYSTOLIC BLOOD PRESSURE: 120 MMHG | DIASTOLIC BLOOD PRESSURE: 70 MMHG | RESPIRATION RATE: 16 BRPM

## 2019-05-16 PROCEDURE — 36590 REMOVAL TUNNELED CV CATH: CPT | Performed by: SURGERY

## 2019-05-16 PROCEDURE — 36590 REMOVAL TUNNELED CV CATH: CPT

## 2019-05-16 RX ORDER — HYDROMORPHONE HCL 110MG/55ML
0.5 PATIENT CONTROLLED ANALGESIA SYRINGE INTRAVENOUS EVERY 5 MIN PRN
Status: DISCONTINUED | OUTPATIENT
Start: 2019-05-16 | End: 2019-05-16 | Stop reason: HOSPADM

## 2019-05-16 RX ORDER — MEPERIDINE HYDROCHLORIDE 25 MG/ML
12.5 INJECTION INTRAMUSCULAR; INTRAVENOUS; SUBCUTANEOUS EVERY 5 MIN PRN
Status: DISCONTINUED | OUTPATIENT
Start: 2019-05-16 | End: 2019-05-16 | Stop reason: HOSPADM

## 2019-05-16 RX ORDER — METOCLOPRAMIDE HYDROCHLORIDE 5 MG/ML
10 INJECTION INTRAMUSCULAR; INTRAVENOUS
Status: DISCONTINUED | OUTPATIENT
Start: 2019-05-16 | End: 2019-05-16 | Stop reason: HOSPADM

## 2019-05-16 RX ORDER — PROPOFOL 10 MG/ML
INJECTION, EMULSION INTRAVENOUS PRN
Status: DISCONTINUED | OUTPATIENT
Start: 2019-05-16 | End: 2019-05-16 | Stop reason: SDUPTHER

## 2019-05-16 RX ORDER — DIPHENHYDRAMINE HYDROCHLORIDE 50 MG/ML
12.5 INJECTION INTRAMUSCULAR; INTRAVENOUS
Status: DISCONTINUED | OUTPATIENT
Start: 2019-05-16 | End: 2019-05-16 | Stop reason: HOSPADM

## 2019-05-16 RX ORDER — ENALAPRILAT 2.5 MG/2ML
1.25 INJECTION INTRAVENOUS
Status: DISCONTINUED | OUTPATIENT
Start: 2019-05-16 | End: 2019-05-16 | Stop reason: HOSPADM

## 2019-05-16 RX ORDER — HYDROMORPHONE HCL 110MG/55ML
0.25 PATIENT CONTROLLED ANALGESIA SYRINGE INTRAVENOUS EVERY 5 MIN PRN
Status: DISCONTINUED | OUTPATIENT
Start: 2019-05-16 | End: 2019-05-16 | Stop reason: HOSPADM

## 2019-05-16 RX ORDER — MORPHINE SULFATE 10 MG/ML
4 INJECTION, SOLUTION INTRAMUSCULAR; INTRAVENOUS EVERY 5 MIN PRN
Status: DISCONTINUED | OUTPATIENT
Start: 2019-05-16 | End: 2019-05-16 | Stop reason: HOSPADM

## 2019-05-16 RX ORDER — PROMETHAZINE HYDROCHLORIDE 25 MG/ML
6.25 INJECTION, SOLUTION INTRAMUSCULAR; INTRAVENOUS
Status: DISCONTINUED | OUTPATIENT
Start: 2019-05-16 | End: 2019-05-16 | Stop reason: HOSPADM

## 2019-05-16 RX ORDER — MORPHINE SULFATE 10 MG/ML
2 INJECTION, SOLUTION INTRAMUSCULAR; INTRAVENOUS EVERY 5 MIN PRN
Status: DISCONTINUED | OUTPATIENT
Start: 2019-05-16 | End: 2019-05-16 | Stop reason: HOSPADM

## 2019-05-16 RX ORDER — HYDRALAZINE HYDROCHLORIDE 20 MG/ML
5 INJECTION INTRAMUSCULAR; INTRAVENOUS EVERY 10 MIN PRN
Status: DISCONTINUED | OUTPATIENT
Start: 2019-05-16 | End: 2019-05-16 | Stop reason: HOSPADM

## 2019-05-16 RX ORDER — SODIUM CHLORIDE, SODIUM LACTATE, POTASSIUM CHLORIDE, CALCIUM CHLORIDE 600; 310; 30; 20 MG/100ML; MG/100ML; MG/100ML; MG/100ML
INJECTION, SOLUTION INTRAVENOUS CONTINUOUS
Status: DISCONTINUED | OUTPATIENT
Start: 2019-05-16 | End: 2019-05-16 | Stop reason: HOSPADM

## 2019-05-16 RX ORDER — LIDOCAINE HYDROCHLORIDE 10 MG/ML
INJECTION, SOLUTION INFILTRATION; PERINEURAL PRN
Status: DISCONTINUED | OUTPATIENT
Start: 2019-05-16 | End: 2019-05-16 | Stop reason: SDUPTHER

## 2019-05-16 RX ORDER — LIDOCAINE HYDROCHLORIDE 10 MG/ML
1 INJECTION, SOLUTION EPIDURAL; INFILTRATION; INTRACAUDAL; PERINEURAL
Status: DISCONTINUED | OUTPATIENT
Start: 2019-05-16 | End: 2019-05-16 | Stop reason: HOSPADM

## 2019-05-16 RX ORDER — LABETALOL HYDROCHLORIDE 5 MG/ML
5 INJECTION, SOLUTION INTRAVENOUS EVERY 10 MIN PRN
Status: DISCONTINUED | OUTPATIENT
Start: 2019-05-16 | End: 2019-05-16 | Stop reason: HOSPADM

## 2019-05-16 RX ADMIN — LIDOCAINE HYDROCHLORIDE 30 MG: 10 INJECTION, SOLUTION INFILTRATION; PERINEURAL at 12:20

## 2019-05-16 RX ADMIN — PROPOFOL 170 MG: 10 INJECTION, EMULSION INTRAVENOUS at 12:20

## 2019-05-16 RX ADMIN — SODIUM CHLORIDE, SODIUM LACTATE, POTASSIUM CHLORIDE, CALCIUM CHLORIDE: 600; 310; 30; 20 INJECTION, SOLUTION INTRAVENOUS at 11:09

## 2019-05-16 NOTE — BRIEF OP NOTE
Brief Postoperative Note      DATE OF PROCEDURE: 5/16/2019     SURGEON: Alyssa Armendariz MD    PREOPERATIVE DIAGNOSIS: Z85.3    POSTOPERATIVE DIAGNOSIS: Same     OPERATION: Procedure(s):  PORT REMOVAL    ANESTHESIA: Monitor Anesthesia Care    ESTIMATED BLOOD LOSS: Minimal    COMPLICATIONS: None. SPECIMENS: * No specimens in log *    DRAINS: None    The patient tolerated the procedure well.     Electronically signed by Alyssa Armendariz MD  on 5/16/2019 at 12:32 PM

## 2019-05-16 NOTE — ANESTHESIA PRE PROCEDURE
Department of Anesthesiology  Preprocedure Note       Name:  Deshaun Paz   Age:  68 y.o.  :  1945                                          MRN:  [id number]         Date:  2019      Surgeon: Pablo Sarkar):  Benji Redman MD    Procedure: PORT REMOVAL (N/A Chest)    Medications prior to admission:   Prior to Admission medications    Medication Sig Start Date End Date Taking? Authorizing Provider   gabapentin (NEURONTIN) 100 MG capsule Take 100 mg by mouth daily. .   Yes Historical Provider, MD   montelukast (SINGULAIR) 10 MG tablet Take 1 tablet by mouth nightly 19  Yes Benji Redman MD   anastrozole (ARIMIDEX) 1 MG tablet Take 1 mg by mouth daily   Yes Historical Provider, MD   HYDROcodone-acetaminophen (NORCO) 5-325 MG per tablet Take 1 tablet by mouth every 6 hours as needed for Pain. . 7/3/18 7/3/19 Yes Benji Redman MD   pravastatin (PRAVACHOL) 40 MG tablet Take 20 mg by mouth daily  10/30/17  Yes Historical Provider, MD   metoprolol succinate (TOPROL XL) 25 MG extended release tablet Take 25 mg by mouth nightly  10/30/17  Yes Historical Provider, MD   Omega-3 Fatty Acids (FISH OIL) 1200 MG CAPS Take 1 capsule by mouth 2 times daily    Yes Historical Provider, MD   Multiple Vitamin (MULTI-VITAMIN DAILY PO) Take 1 tablet by mouth daily    Yes Historical Provider, MD   calcium carbonate (OSCAL) 500 MG TABS tablet Take 500 mg by mouth daily   Yes Historical Provider, MD   vitamin C (ASCORBIC ACID) 500 MG tablet Take 500 mg by mouth daily   Yes Historical Provider, MD   BIOTIN FORTE PO Take 1 tablet by mouth daily    Yes Historical Provider, MD   diazepam (VALIUM) 5 MG tablet Bring bottle to hospital and take as directed. . 17  Yes Benji Redman MD       Current medications:    Current Facility-Administered Medications   Medication Dose Route Frequency Provider Last Rate Last Dose    lactated ringers infusion   Intravenous Continuous QuTOMA Pizano - CRNA 125 mL/hr at 19 Component Value Date    WBC 4.2 07/02/2018    RBC 3.85 07/02/2018    HGB 11.9 07/02/2018    HCT 37.8 07/02/2018    MCV 98.2 07/02/2018    RDW 12.1 07/02/2018     07/02/2018       CMP:   Lab Results   Component Value Date     07/02/2018    K 3.9 07/02/2018     07/02/2018    CO2 28 07/02/2018    BUN 19 07/02/2018    CREATININE 0.7 07/02/2018    LABGLOM >60 07/02/2018    GLUCOSE 129 07/02/2018    CALCIUM 9.5 07/02/2018       POC Tests: No results for input(s): POCGLU, POCNA, POCK, POCCL, POCBUN, POCHEMO, POCHCT in the last 72 hours. Coags: No results found for: PROTIME, INR, APTT    HCG (If Applicable): No results found for: PREGTESTUR, PREGSERUM, HCG, HCGQUANT     ABGs: No results found for: PHART, PO2ART, FZV5HLT, JLD5XMI, BEART, N5CKOYVO     Type & Screen (If Applicable):  No results found for: LABABO, [address]    Anesthesia Evaluation  Patient summary reviewed and Nursing notes reviewed  Airway: Mallampati: I  TM distance: >3 FB   Neck ROM: full  Mouth opening: > = 3 FB Dental:          Pulmonary:Negative Pulmonary ROS and normal exam  breath sounds clear to auscultation                             Cardiovascular:Negative CV ROS  Exercise tolerance: good (>4 METS),           Rhythm: regular  Rate: normal           Beta Blocker:  Not on Beta Blocker         Neuro/Psych:   Negative Neuro/Psych ROS              GI/Hepatic/Renal: Neg GI/Hepatic/Renal ROS            Endo/Other: Negative Endo/Other ROS             Pt had no PAT visit       Abdominal:           Vascular: negative vascular ROS. Anesthesia Plan      MAC     ASA 2       Induction: intravenous. MIPS: Postoperative opioids intended and Prophylactic antiemetics administered. Anesthetic plan and risks discussed with patient. Use of blood products discussed with patient whom.                    TOMA Leggett - CRNA   5/16/2019

## 2019-05-16 NOTE — H&P
is probably benign as it   is not confirmed on additional images. BI-RADS Final Assessment   Category 3: Probably benign. 3. Recommend follow-up diagnostic mammogram in 3 months with spot   compression views and possible ultrasound.      Right mammogram-4/22/2019  Narrative   EXAMINATION: [facility] DIGITAL DIAGNOSTIC UNILATERAL RIGHT 4/22/2019 12:41   PM   HISTORY: 63-year-old female with a personal history of right breast   carcinoma, previous lumpectomy returns for three-month follow-up of an   asymmetric density in the deep inferior right breast only well seen on   oblique images. Report: Today's examination consists of craniocaudal, oblique and true   lateral views of the right breast with 3-D kailee synthesis, as well as   a focal spot compression view of the right breast in the oblique   dimension. Comparison is made with the diagnostic mammograms   1/17/2019. The breast parenchyma is moderately dense, in a Pattern C parenchymal   pattern, which may lower the sensitivity of the study. There are   stable post therapeutic changes in the right breast at 11-12 o'clock,   the asymmetry seen in the deep inferior right breast on previous   diagnostic mammograms is not visualized. No new mass or suspicious   findings are identified. The patient is due to return for bilateral   mammograms in 9 months, January, 2020.       Impression   No suspicious findings are seen in the right breast, there   are post therapeutic changes as before. The patient is due to return   for bilateral mammograms in 9 months, January, 2020. BI-RADS Category   2, benign.         She is wanting to get her port out. We will do this at her convenience.     Marin Garg is a 68 y.o. female with the following history as recorded in [facility]:  Patient Active Problem List    Diagnosis Date Noted    S/P lumpectomy, right breast 7/3/2018 07/11/2018    Malignant neoplasm of upper-inner quadrant of right breast in female, estrogen receptor positive ([facility] 12/08/2017     Current Facility-Administered Medications   Medication Dose Route Frequency Provider Last Rate Last Dose    lactated ringers infusion   Intravenous Continuous TOMA Gupta CRNA        lidocaine PF 1 % injection 1 mL  1 mL Intradermal Once PRN TOMA Gupta CRNA         Allergies: Patient has no known allergies. Past Medical History:   Diagnosis Date    Arthritis     Cancer ([facility] [address]     breast    Hyperlipidemia     Palpitations     Post-menopausal     Prolonged emergence from general anesthesia      Past Surgical History:   Procedure Laterality Date    JOINT REPLACEMENT Left 2017    left knee/ toe joints    OVARY REMOVAL Left 1993    [facility] INSJ PR[facility] CTR VAD W/SUBQ PORT AGE 5 YR/> N/A 1/11/2018    PORT INSERTION performed by Krystin Reynolds MD at [address] [facility] [address], PARTIAL Right 7/3/2018    LUMPECTOMY W/SNB AND INTRAOP US GUIDED NL performed by Krystin Reynolds MD at [address] [facility] TOE SURGERY Bilateral [id number]     Family History   Problem Relation Age of Onset   Verba Bright Breast Cancer Mother [de-identified]    Heart Disease Mother      Social History     Tobacco Use    Smoking status: Never Smoker    Smokeless tobacco: Never Used   Substance Use Topics    Alcohol use: No       ROS:  14 point review of systems is negative except for the above. PHYSICAL EXAM:    The patient is a 68 y.o. female  in no acute distress. She is alert oriented and cooperative. Mood and affect are appropriate. Skin is warm and dry without rashes. .VS    HEENT: Normocephalic and atraumatic. EOMs intact. Pupils equal and round and reactive to light and accommodation. External ears and nose are normal.  Sclera nonicteric. Conjunctiva normal  Oropharynx without masses or lesions. Neck: Neck is supple without masses or thyromegaly    Chest: Lungs are clear to auscultation. Respiratory effort normal    Cardiac: Regular rate and rhythm without rubs, murmurs, or gallops    Breasts: The right lumpectomy incision is looking good. She has fibrocystic changes bilaterally and appropriate postoperative scarring on the right. There are no dominant masses, no skin or nipple changes, and no axillary adenopathy. There is no evidence of new or recurrent neoplasm. Abdomen: The abdomen is soft and nontender with no hepatosplenomegaly. There are no abdominal hernias noted. Extremities:  The extremities are normal. There are no signs of clubbing, cyanosis, or edema.      IMPRESSION:     Doing well s/p  right lumpectomy and sentinel node biopsy 7/3/2018.     PLAN:  I will plan to see her back in September with physical exam. She will call if anything changes.     Port removal at [facility] at her convenience.

## 2019-05-17 NOTE — PROCEDURES
[address] [facility], [address]                [address] [phone]                                 PROCEDURE NOTE    PATIENT NAME: Ashli Ventura                     :             1945  MED REC NO:   [id number]                               Deo Wang  ACCOUNT NO:   [de-identified]                            ADMISSION DATE:  2019  PROVIDER:     Eugenio Moran MD      DATE OF SURGERY:  2019    PREOPERATIVE DIAGNOSIS:  Breast cancer, completion of chemotherapy. POSTOPERATIVE DIAGNOSIS:  Breast cancer, completion of chemotherapy. PROCEDURE:  Removal of LifePort catheter. SURGEON:  Eugenio Moran MD    ASSISTANT:  Selin Paulino PA-C    ANESTHESIA:  Local with sedation. INDICATIONS:  The patient is a 70-year-old lady who has completed her  chemotherapy for breast cancer. She is ready to have her port removed. We discussed the risks and benefits. She understands and is agreeable. OPERATIVE PROCEDURE:  Today, she was brought into the operating room,  adequately sedated, and prepped and draped in sterile fashion. The area  around the port was anesthetized with 1/16% Xylocaine. We then reopened  the old incision and dissected down to the port and placed a 3-0 Vicryl  pursestring around the insertion site. We then withdrew the catheter  and tied down our pursestring. We then removed the port with its  capsule. There was no evidence of infection. We then irrigated  copiously, obtained good hemostasis. Closed with 3-0 Vicryl for the  subcu and 4-0 Monocryl for the skin. Sterile dressing was applied. Estimated blood loss, minimal.  Complications, none. She tolerated the  procedure well.         Kathleen Flores MD    D: 2019 13:56:31      T: 2019 3:53:38     [id number]  Job#: [id number]     Doc#: [id number]    CC:

## 2019-05-20 ENCOUNTER — HOSPITAL ENCOUNTER (OUTPATIENT)
Dept: CT IMAGING | Age: 74
Discharge: HOME | End: 2019-05-20
Payer: MEDICARE

## 2019-05-20 DIAGNOSIS — R91.1 PULMONARY NODULE: ICD-10-CM

## 2019-05-20 PROCEDURE — 6360000004 HC RX CONTRAST MEDICATION: Performed by: INTERNAL MEDICINE

## 2019-05-20 PROCEDURE — 71260 CT THORAX DX C+: CPT

## 2019-05-20 RX ADMIN — IOPAMIDOL 75 ML: 755 INJECTION, SOLUTION INTRAVENOUS at 09:48

## 2019-09-30 ENCOUNTER — OFFICE VISIT (OUTPATIENT)
Dept: SURGERY | Age: 74
End: 2019-09-30
Payer: MEDICARE

## 2019-09-30 VITALS — DIASTOLIC BLOOD PRESSURE: 80 MMHG | HEART RATE: 72 BPM | SYSTOLIC BLOOD PRESSURE: 128 MMHG

## 2019-09-30 DIAGNOSIS — C50.211 MALIGNANT NEOPLASM OF UPPER-INNER QUADRANT OF RIGHT BREAST IN FEMALE, ESTROGEN RECEPTOR POSITIVE (HCC): Primary | ICD-10-CM

## 2019-09-30 DIAGNOSIS — Z17.0 MALIGNANT NEOPLASM OF UPPER-INNER QUADRANT OF RIGHT BREAST IN FEMALE, ESTROGEN RECEPTOR POSITIVE (HCC): Primary | ICD-10-CM

## 2019-09-30 DIAGNOSIS — Z98.890 S/P LUMPECTOMY, RIGHT BREAST: ICD-10-CM

## 2019-09-30 PROCEDURE — G8400 PT W/DXA NO RESULTS DOC: HCPCS | Performed by: SURGERY

## 2019-09-30 PROCEDURE — G8420 CALC BMI NORM PARAMETERS: HCPCS | Performed by: SURGERY

## 2019-09-30 PROCEDURE — 1123F ACP DISCUSS/DSCN MKR DOCD: CPT | Performed by: SURGERY

## 2019-09-30 PROCEDURE — 99213 OFFICE O/P EST LOW 20 MIN: CPT | Performed by: SURGERY

## 2019-09-30 PROCEDURE — 1090F PRES/ABSN URINE INCON ASSESS: CPT | Performed by: SURGERY

## 2019-09-30 PROCEDURE — G8428 CUR MEDS NOT DOCUMENT: HCPCS | Performed by: SURGERY

## 2019-09-30 PROCEDURE — 4040F PNEUMOC VAC/ADMIN/RCVD: CPT | Performed by: SURGERY

## 2019-09-30 PROCEDURE — 1036F TOBACCO NON-USER: CPT | Performed by: SURGERY

## 2019-09-30 PROCEDURE — 3017F COLORECTAL CA SCREEN DOC REV: CPT | Performed by: SURGERY

## 2019-10-03 DIAGNOSIS — Z85.3 PERSONAL HISTORY OF BREAST CANCER: Primary | ICD-10-CM

## 2019-11-22 ENCOUNTER — HOSPITAL ENCOUNTER (OUTPATIENT)
Dept: INFUSION THERAPY | Age: 74
Discharge: HOME | End: 2019-11-22
Payer: MEDICARE

## 2019-11-22 ENCOUNTER — OFFICE VISIT (OUTPATIENT)
Dept: HEMATOLOGY | Age: 74
End: 2019-11-22
Payer: MEDICARE

## 2019-11-22 VITALS
DIASTOLIC BLOOD PRESSURE: 70 MMHG | SYSTOLIC BLOOD PRESSURE: 120 MMHG | OXYGEN SATURATION: 97 % | HEART RATE: 71 BPM | WEIGHT: 149.6 LBS | BODY MASS INDEX: 24.04 KG/M2 | HEIGHT: 66 IN

## 2019-11-22 DIAGNOSIS — Z17.0 MALIGNANT NEOPLASM OF UPPER-INNER QUADRANT OF RIGHT BREAST IN FEMALE, ESTROGEN RECEPTOR POSITIVE (HCC): Primary | ICD-10-CM

## 2019-11-22 DIAGNOSIS — T45.1X5A PERIPHERAL NEUROPATHY DUE TO CHEMOTHERAPY (HCC): ICD-10-CM

## 2019-11-22 DIAGNOSIS — Z17.0 MALIGNANT NEOPLASM OF UPPER-INNER QUADRANT OF RIGHT BREAST IN FEMALE, ESTROGEN RECEPTOR POSITIVE (HCC): ICD-10-CM

## 2019-11-22 DIAGNOSIS — C50.211 MALIGNANT NEOPLASM OF UPPER-INNER QUADRANT OF RIGHT BREAST IN FEMALE, ESTROGEN RECEPTOR POSITIVE (HCC): Primary | ICD-10-CM

## 2019-11-22 DIAGNOSIS — G62.0 PERIPHERAL NEUROPATHY DUE TO CHEMOTHERAPY (HCC): ICD-10-CM

## 2019-11-22 DIAGNOSIS — C50.211 MALIGNANT NEOPLASM OF UPPER-INNER QUADRANT OF RIGHT BREAST IN FEMALE, ESTROGEN RECEPTOR POSITIVE (HCC): ICD-10-CM

## 2019-11-22 DIAGNOSIS — R91.1 INCIDENTAL LUNG NODULE, > 3MM AND < 8MM: ICD-10-CM

## 2019-11-22 PROCEDURE — 4040F PNEUMOC VAC/ADMIN/RCVD: CPT | Performed by: NURSE PRACTITIONER

## 2019-11-22 PROCEDURE — 1090F PRES/ABSN URINE INCON ASSESS: CPT | Performed by: NURSE PRACTITIONER

## 2019-11-22 PROCEDURE — G8400 PT W/DXA NO RESULTS DOC: HCPCS | Performed by: NURSE PRACTITIONER

## 2019-11-22 PROCEDURE — 85025 COMPLETE CBC W/AUTO DIFF WBC: CPT

## 2019-11-22 PROCEDURE — 99211 OFF/OP EST MAY X REQ PHY/QHP: CPT

## 2019-11-22 PROCEDURE — 99214 OFFICE O/P EST MOD 30 MIN: CPT | Performed by: NURSE PRACTITIONER

## 2019-11-22 PROCEDURE — G8420 CALC BMI NORM PARAMETERS: HCPCS | Performed by: NURSE PRACTITIONER

## 2019-11-22 PROCEDURE — 1036F TOBACCO NON-USER: CPT | Performed by: NURSE PRACTITIONER

## 2019-11-22 PROCEDURE — G8484 FLU IMMUNIZE NO ADMIN: HCPCS | Performed by: NURSE PRACTITIONER

## 2019-11-22 PROCEDURE — 3017F COLORECTAL CA SCREEN DOC REV: CPT | Performed by: NURSE PRACTITIONER

## 2019-11-22 PROCEDURE — 1123F ACP DISCUSS/DSCN MKR DOCD: CPT | Performed by: NURSE PRACTITIONER

## 2019-11-22 PROCEDURE — G8427 DOCREV CUR MEDS BY ELIG CLIN: HCPCS | Performed by: NURSE PRACTITIONER

## 2019-12-25 PROBLEM — G62.0 PERIPHERAL NEUROPATHY DUE TO CHEMOTHERAPY (HCC): Status: ACTIVE | Noted: 2019-12-25

## 2019-12-25 PROBLEM — T45.1X5A PERIPHERAL NEUROPATHY DUE TO CHEMOTHERAPY (HCC): Status: ACTIVE | Noted: 2019-12-25

## 2019-12-25 PROBLEM — R91.1 INCIDENTAL LUNG NODULE, > 3MM AND < 8MM: Status: ACTIVE | Noted: 2019-12-25

## 2019-12-25 ASSESSMENT — ENCOUNTER SYMPTOMS
SHORTNESS OF BREATH: 0
EYE DISCHARGE: 0
SORE THROAT: 0
DIARRHEA: 0
COUGH: 0
EYE PAIN: 0
VOMITING: 0
WHEEZING: 0
NAUSEA: 0
EYE REDNESS: 0
BLOOD IN STOOL: 0
RESPIRATORY NEGATIVE: 1
EYES NEGATIVE: 1
CONSTIPATION: 0
GASTROINTESTINAL NEGATIVE: 1
ABDOMINAL PAIN: 0
BACK PAIN: 0

## 2020-01-17 NOTE — PROGRESS NOTES
MAMMOGRAM-Left Breast US  Narrative   INDICATION: History of right breast cancer status post lumpectomy,   radiation and chemotherapy in 2018. Left breast upper outer quadrant   palpable concern. BREAST COMPOSITION: Category C --heterogeneously dense (approximately   50-75 percent glandular tissue). Heterogeneously dense breast tissue   can obscure small masses. FINDINGS: There are no suspicious findings in the right breast.   Posttreatment changes to the right upper outer breast and axilla. Left breast with 0.6 cm focal asymmetry subjacent to the triangle   marker at the junction of the middle and posterior thirds, upper outer   quadrant, 1:00-2:00 position, 10.4 cm from the nipple on MLO view. This disperses on additional view, consistent with overlapping tissue. Targeted physical exam of the left breast, upper outer quadrant, area   of palpable concern, 2:00 position, 7 cm from the nipple. No   suspicious palpable abnormality, but the patient's area of concern is   localized, which feels like firm breast tissue. Targeted ultrasound left outer central and upper outer breast. In the   left breast, 2:00 position, 7 cm from the nipple, there is a ridge of   dense breast tissue, which correlates with the patient's area of   palpable concern.       Impression   1. No mammographic or targeted ultrasonographic evidence of   malignancy. Recommend annual mammography. 2. Breast density notification will be sent to the patient. Results and recommendations given to the patient at the conclusion of   the exam.  A result letter will be sent by mail. BI-RADS CATEGORY 1: NEGATIVE. PHYSICAL EXAM:  The right lumpectomy incision is looking good. She has fibrocystic changes bilaterally and appropriate postoperative scarring on the right. There are no dominant masses, no skin or nipple changes, and no axillary adenopathy. There is no evidence of new or recurrent neoplasm.         IMPRESSION:    Doing well s/p

## 2020-01-20 ENCOUNTER — OFFICE VISIT (OUTPATIENT)
Dept: SURGERY | Age: 75
End: 2020-01-20
Payer: MEDICARE

## 2020-01-20 ENCOUNTER — HOSPITAL ENCOUNTER (OUTPATIENT)
Dept: WOMENS IMAGING | Age: 75
Discharge: HOME | End: 2020-01-20
Payer: MEDICARE

## 2020-01-20 VITALS — DIASTOLIC BLOOD PRESSURE: 80 MMHG | HEART RATE: 80 BPM | SYSTOLIC BLOOD PRESSURE: 128 MMHG

## 2020-01-20 PROCEDURE — 3017F COLORECTAL CA SCREEN DOC REV: CPT | Performed by: SURGERY

## 2020-01-20 PROCEDURE — G8420 CALC BMI NORM PARAMETERS: HCPCS | Performed by: SURGERY

## 2020-01-20 PROCEDURE — 1090F PRES/ABSN URINE INCON ASSESS: CPT | Performed by: SURGERY

## 2020-01-20 PROCEDURE — 99213 OFFICE O/P EST LOW 20 MIN: CPT | Performed by: SURGERY

## 2020-01-20 PROCEDURE — G8428 CUR MEDS NOT DOCUMENT: HCPCS | Performed by: SURGERY

## 2020-01-20 PROCEDURE — 4040F PNEUMOC VAC/ADMIN/RCVD: CPT | Performed by: SURGERY

## 2020-01-20 PROCEDURE — G8400 PT W/DXA NO RESULTS DOC: HCPCS | Performed by: SURGERY

## 2020-01-20 PROCEDURE — 1123F ACP DISCUSS/DSCN MKR DOCD: CPT | Performed by: SURGERY

## 2020-01-20 PROCEDURE — 76642 ULTRASOUND BREAST LIMITED: CPT

## 2020-01-20 PROCEDURE — G8484 FLU IMMUNIZE NO ADMIN: HCPCS | Performed by: SURGERY

## 2020-01-20 PROCEDURE — 1036F TOBACCO NON-USER: CPT | Performed by: SURGERY

## 2020-01-20 PROCEDURE — G0279 TOMOSYNTHESIS, MAMMO: HCPCS

## 2020-04-01 VITALS
WEIGHT: 148 LBS | SYSTOLIC BLOOD PRESSURE: 122 MMHG | HEART RATE: 66 BPM | DIASTOLIC BLOOD PRESSURE: 70 MMHG | OXYGEN SATURATION: 98 % | BODY MASS INDEX: 23.89 KG/M2

## 2020-04-01 RX ORDER — ONDANSETRON HYDROCHLORIDE 8 MG/1
8 TABLET, FILM COATED ORAL EVERY 8 HOURS PRN
COMMUNITY
End: 2022-01-27

## 2020-04-02 RX ORDER — ANASTROZOLE 1 MG/1
TABLET ORAL
Qty: 90 TABLET | Refills: 3 | Status: SHIPPED | OUTPATIENT
Start: 2020-04-02 | End: 2021-04-06 | Stop reason: SDUPTHER

## 2020-04-22 VITALS
WEIGHT: 148 LBS | BODY MASS INDEX: 23.89 KG/M2 | SYSTOLIC BLOOD PRESSURE: 122 MMHG | HEART RATE: 66 BPM | DIASTOLIC BLOOD PRESSURE: 70 MMHG

## 2020-05-18 ENCOUNTER — TELEPHONE (OUTPATIENT)
Dept: INFUSION THERAPY | Age: 75
End: 2020-05-18

## 2020-05-18 ENCOUNTER — TELEPHONE (OUTPATIENT)
Dept: HEMATOLOGY | Age: 75
End: 2020-05-18

## 2020-05-22 ENCOUNTER — VIRTUAL VISIT (OUTPATIENT)
Dept: HEMATOLOGY | Age: 75
End: 2020-05-22
Payer: MEDICARE

## 2020-05-22 ENCOUNTER — TELEPHONE (OUTPATIENT)
Dept: HEMATOLOGY | Age: 75
End: 2020-05-22

## 2020-05-22 PROBLEM — Z79.811 ENCOUNTER FOR MONITORING AROMATASE INHIBITOR THERAPY: Status: ACTIVE | Noted: 2020-05-22

## 2020-05-22 PROBLEM — Z91.89 AT RISK FOR OSTEOPENIA: Status: ACTIVE | Noted: 2020-05-22

## 2020-05-22 PROBLEM — Z51.81 ENCOUNTER FOR MONITORING AROMATASE INHIBITOR THERAPY: Status: ACTIVE | Noted: 2020-05-22

## 2020-05-22 PROCEDURE — 3017F COLORECTAL CA SCREEN DOC REV: CPT | Performed by: NURSE PRACTITIONER

## 2020-05-22 PROCEDURE — G8428 CUR MEDS NOT DOCUMENT: HCPCS | Performed by: NURSE PRACTITIONER

## 2020-05-22 PROCEDURE — G8400 PT W/DXA NO RESULTS DOC: HCPCS | Performed by: NURSE PRACTITIONER

## 2020-05-22 PROCEDURE — 1123F ACP DISCUSS/DSCN MKR DOCD: CPT | Performed by: NURSE PRACTITIONER

## 2020-05-22 PROCEDURE — 99213 OFFICE O/P EST LOW 20 MIN: CPT | Performed by: NURSE PRACTITIONER

## 2020-05-22 PROCEDURE — 1090F PRES/ABSN URINE INCON ASSESS: CPT | Performed by: NURSE PRACTITIONER

## 2020-05-22 PROCEDURE — 4040F PNEUMOC VAC/ADMIN/RCVD: CPT | Performed by: NURSE PRACTITIONER

## 2020-05-22 ASSESSMENT — ENCOUNTER SYMPTOMS
CONSTIPATION: 0
COUGH: 0
NAUSEA: 0
BLOOD IN STOOL: 0
RESPIRATORY NEGATIVE: 1
EYE DISCHARGE: 0
SORE THROAT: 0
BACK PAIN: 0
EYE REDNESS: 0
DIARRHEA: 0
GASTROINTESTINAL NEGATIVE: 1
WHEEZING: 0
VOMITING: 0
ABDOMINAL PAIN: 0
EYES NEGATIVE: 1
EYE PAIN: 0
SHORTNESS OF BREATH: 0

## 2020-05-22 NOTE — PROGRESS NOTES
Neurological:        [x] No Facial Asymmetry (Cranial nerve 7 motor function) (limited exam to video visit)          [x] No gaze palsy        [] Abnormal-         Skin:        [x] No significant exanthematous lesions or discoloration noted on facial skin         [] Abnormal-            Psychiatric:       [x] Normal Affect [] No Hallucinations        [] Abnormal-     Other pertinent observable physical exam findings-     ASSESSMENT/PLAN:  1. Malignant neoplasm of upper-inner quadrant of right breast in female, estrogen receptor positive [facility]), December 2017. Currently taking adjuvant aromatase inhibitor therapy with Arimidex, anticipate continued dosing until 7/21/2023. Mammogram up-to-date and without evidence of malignancy. Denies breast complaints. Tolerating Arimidex without difficulty    -Continue Arimidex 1 mg daily  -Keep scheduled follow-up appointment with Dr. Mary Alice Haynes    2. Incidental lung nodule, initially measuring 9 mm in the RUL on 1/24/2018 and measured 6 mm on 5/20/2019. Asymptomatic denying any respiratory complaints. Will repeat CT scan, if no change will consider nodule to be benign and no further routine surveillance will be warranted unless becomes symptomatic.    - CT Chest W Contrast; Future    3. Peripheral neuropathy due to chemotherapy ([facility] [address] secondary to Taxol. Under the direction of Dr. Saenz Covert. Reports neuropathy to feet is stable with no change from previous evaluation. 4. At risk for osteopenia. Bone mineral density study November 2017 was within normal limits, recommendation is for repeat study in July 2021.  -Continue calcium supplement 1200 mg daily.       4. Bone health: Bone mineral density study in November 2017 was normal.  Mahala Pipes indicated not to repeat bone mineral study until July 2021.  -Continue calcium supplement     FOLLOW UP:  1. Follow-up appointment given for 6 months, sooner if needed  2.  Continue to follow with other medical providers as recommended      Return in about 6 months (around 11/16/2020) for See Norm Rambo Baker is a 76 y.o. female being evaluated by a Virtual Visit (video visit) encounter to address concerns as mentioned above. Patient identification was verified at start of the visit. Patient did not have any family members present during virtual visit. Due to this being a TeleHealth encounter (During HEZLC-55 public health emergency), evaluation of the following organ systems was limited:  Vitals/Constitutional/EENT/Resp/CV/GI//MS/Neuro/Skin/Heme-Lymph-Imm. Pursuant to the emergency declaration under the [facility], [facility] waiver authority and the [employer] Act, this Virtual Visit was conducted with patient's consent, to reduce the patient's risk of exposure to COVID-19 and provide necessary medical care. The patient has also been advised to contact this office for worsening conditions or problems, and seek emergency medical treatment and/or call 911 if deemed necessary. Services were provided through a video synchronous discussion virtually to substitute for in-person clinic visit. Patient was located at personal residence in [place], [place] and provider was located [facility] in [facility]. Over 50% of the total virtual visit time of 15 minutes via video camera with the patient, out of which more than 50% of the time was spent in counseling patient  and coordination of care. Counseling included but was not limited to time spent reviewing recent labs, imaging studies/ treatment plan and answering questions. This does not include charting. --TOMA Lamb on 5/22/2020 at 3:44 PM    An electronic signature was used to authenticate this note.

## 2020-05-22 NOTE — TELEPHONE ENCOUNTER
Called and registered patient for virtual visit with Michael Cortez and she accepted all insurance terms.

## 2020-06-23 ENCOUNTER — HOSPITAL ENCOUNTER (OUTPATIENT)
Dept: CT IMAGING | Age: 75
Discharge: HOME | End: 2020-06-23
Payer: MEDICARE

## 2020-06-23 PROCEDURE — 71260 CT THORAX DX C+: CPT

## 2020-06-23 PROCEDURE — 6360000004 HC RX CONTRAST MEDICATION: Performed by: NURSE PRACTITIONER

## 2020-06-23 RX ADMIN — IOPAMIDOL 75 ML: 755 INJECTION, SOLUTION INTRAVENOUS at 14:23

## 2020-07-17 NOTE — PROGRESS NOTES
HISTORY OF PRESENT ILLNESS:  Ms. Celina Rojas  is a 76 y.o.   female   who is status post right lumpectomy and sentinel node biopsy on 7/3/2018. This was following neoadjuvant chemotherapy for a 3.2  cm low grade invasive ductal carcinoma. ER/MS strongly positive. MS was strongly positive. HER-2 was negative by IHC. Ki-67 was 11% and borderline.     Delayed results demonstrated HER-2 as positive by FISH. Unfortunately the HER-2 positive by FISH was not available at that time and after discussion with Dr. Hannah Salinas, we saw her back for consideration of neoadjuvant chemotherapy instead. Repeat HER-2 on her surgical specimen was indeterminate. She underwent right lumpectomy with sentinel node biopsy on 7/3/2018    PATHOLOGY REVEALS:  FINAL DIAGNOSIS:  A. Right breast, lumpectomy with sentinel lymph node biopsy and       ultrasound-guided wire localization:       Invasive ductal carcinoma, grade 1.       Greatest dimension of the residual invasive carcinoma is 1.9 cm.       Invasive carcinoma approaches to the closest anterior caudal margin       at 1 mm.       Ductal carcinoma in situ is present, cribriform and solid patterns,       nuclear grade 1.       The ductal carcinoma in situ approaches to the closest inferior       caudal margin at 10 mm.     B. Right breast, excision of additional caudal and medial margins:       Benign breast tissue.     C. Right breast, excision of additional deep margins:       Benign skeletal muscle and adipose tissue.     D. Lymph node, right axilla, sentinel lymph node biopsy: Benign lymph       node with fatty infiltration and partial hyalinization. She completed her radiation therapy. She is really doing very well. She had a trip to [place] with her sisters which she says is always very special for her. She does not really like the anastrozole. She says it makes her tired. I recommended to do her best to start some aerobic exercise.       PHYSICAL EXAM:  The right lumpectomy incision is looking good. She has fibrocystic changes bilaterally and appropriate postoperative scarring on the right. There are no dominant masses, no skin or nipple changes, and no axillary adenopathy. There is no evidence of new or recurrent neoplasm. IMPRESSION:  Doing well s/p  right lumpectomy and sentinel node biopsy 7/3/2018. PLAN: She is due for bilateral mammograms in January 2021 with a physical exam.She will call if anything changes. I have seen, examined and reviewed this patient medication list, appropriate labs and imaging studies. I reviewed relevant medical records and others physicians notes. I discussed the plans of care with the patient. I answered all the questions to the patients satisfaction. I, Dr Mannie Easton, personally performed the services described in this documentation as scribed by Jewel Walls MA in my presence and is both accurate and complete. (Please note that portions of this note were completed with a voice recognition program. Efforts were made to edit the dictations but occasionally words are mis-transcribed.)  Over 50% of the total visit time of 15 minutes in face to face encounter with the patient, out of which more than 50% of the time was spent in counseling patient or family and coordination of care. Counseling included but was not limited to time spent reviewing labs, imaging studies/ treatment plan and answering questions.

## 2020-07-20 ENCOUNTER — OFFICE VISIT (OUTPATIENT)
Dept: SURGERY | Age: 75
End: 2020-07-20
Payer: MEDICARE

## 2020-07-20 VITALS — HEART RATE: 76 BPM | DIASTOLIC BLOOD PRESSURE: 70 MMHG | SYSTOLIC BLOOD PRESSURE: 124 MMHG

## 2020-07-20 PROCEDURE — 4040F PNEUMOC VAC/ADMIN/RCVD: CPT | Performed by: SURGERY

## 2020-07-20 PROCEDURE — 1090F PRES/ABSN URINE INCON ASSESS: CPT | Performed by: SURGERY

## 2020-07-20 PROCEDURE — 99213 OFFICE O/P EST LOW 20 MIN: CPT | Performed by: SURGERY

## 2020-07-20 PROCEDURE — 1036F TOBACCO NON-USER: CPT | Performed by: SURGERY

## 2020-07-20 PROCEDURE — G8400 PT W/DXA NO RESULTS DOC: HCPCS | Performed by: SURGERY

## 2020-07-20 PROCEDURE — G8428 CUR MEDS NOT DOCUMENT: HCPCS | Performed by: SURGERY

## 2020-07-20 PROCEDURE — 1123F ACP DISCUSS/DSCN MKR DOCD: CPT | Performed by: SURGERY

## 2020-07-20 PROCEDURE — G8420 CALC BMI NORM PARAMETERS: HCPCS | Performed by: SURGERY

## 2020-07-20 PROCEDURE — 3017F COLORECTAL CA SCREEN DOC REV: CPT | Performed by: SURGERY

## 2020-07-20 RX ORDER — MELOXICAM 15 MG/1
TABLET ORAL
COMMUNITY
Start: 2020-06-09

## 2021-01-06 ENCOUNTER — TELEPHONE (OUTPATIENT)
Dept: SURGERY | Age: 76
End: 2021-01-06

## 2021-01-06 NOTE — TELEPHONE ENCOUNTER
Called pt to let her know I scheduled her Mammogram and follow with Dr. James Craig on 1/27/21. Mammogram arrival time 12:25 appointment time 12:30. Follow up with  will be 1:15. Pt understood verbally. Pt requested for me to send out appointment reminder letter.

## 2021-01-22 NOTE — PROGRESS NOTES
HISTORY OF PRESENT ILLNESS:  Ms. Nakita Rosenbaum  is a 76 y.o.   female who presents today for a 6 month breast exam following her annual Mammogram.      She is status post right lumpectomy and sentinel node biopsy on 7/3/2018. This was following neoadjuvant chemotherapy for a 3.2  cm low grade invasive ductal carcinoma. ER/CO strongly positive. CO was strongly positive. HER-2 was negative by IHC. Ki-67 was 11% and borderline.     Delayed results demonstrated HER-2 as positive by FISH. Unfortunately the HER-2 positive by FISH was not available at that time and after discussion with Dr. Marilee Madison, we saw her back for consideration of neoadjuvant chemotherapy instead. Repeat HER-2 on her surgical specimen was indeterminate. She underwent right lumpectomy with sentinel node biopsy on 7/3/2018    PATHOLOGY REVEALS:  FINAL DIAGNOSIS:  A. Right breast, lumpectomy with sentinel lymph node biopsy and       ultrasound-guided wire localization:       Invasive ductal carcinoma, grade 1.       Greatest dimension of the residual invasive carcinoma is 1.9 cm.       Invasive carcinoma approaches to the closest anterior caudal margin       at 1 mm.       Ductal carcinoma in situ is present, cribriform and solid patterns,       nuclear grade 1.       The ductal carcinoma in situ approaches to the closest inferior       caudal margin at 10 mm.     B. Right breast, excision of additional caudal and medial margins:       Benign breast tissue.     C. Right breast, excision of additional deep margins:       Benign skeletal muscle and adipose tissue.     D. Lymph node, right axilla, sentinel lymph node biopsy: Benign lymph       node with fatty infiltration and partial hyalinization. She completed her radiation therapy. She is really doing very well. She had a trip to [place] with her sisters which she says is always very special for her. She does not really like the anastrozole. She says it makes her tired.   I recommended to do her best to start some aerobic exercise. Mammogram-1/27/2021  FINDINGS:   Reidentified treatment changes in the upper right breast near 12:00. There are additional surgical clips noted in the right axilla. There   are a few bilateral benign-appearing calcifications. No new masses or   new suspicious calcifications are identified. There has been no   significant interval change. This study was interpreted with CAD. IMPRESSION AND RECOMMENDATION:    No mammographic evidence of malignancy. Recommendation is for the   patient to return for routine mammography in one year or sooner, if   clinically indicated. BI-RADS CATEGORY 2: BENIGN FINDINGS    The patient's information has been added to a reminder system with a   target due date for the next mammogram.   Signed by Dr Preethi Mckeon on 1/27/2021 12:56 PM     She is still having a lot of problems with neuropathy in her hands. PHYSICAL EXAM:  The right lumpectomy incision is looking good. She has fibrocystic changes bilaterally and appropriate postoperative scarring on the right. There are no dominant masses, no skin or nipple changes, and no axillary adenopathy. There is no evidence of new or recurrent neoplasm. IMPRESSION:  Doing well s/p  right lumpectomy and sentinel node biopsy 7/3/2018. PLAN: I will see her back in 6 months for a physical exam. She will call with any new concerns. I have seen, examined and reviewed this patient medication list, appropriate labs and imaging studies. I reviewed relevant medical records and others physicians notes. I discussed the plans of care with the patient. I answered all the questions to the patients satisfaction. I, Dr aMribell Cuenca, personally performed the services described in this documentation as scribed by Jaron Rosenthal MA in my presence and is both accurate and complete.      (Please note that portions of this note were completed with a voice recognition program. Efforts were made to edit the dictations but occasionally words are mis-transcribed.)  Over 50% of the total visit time of 15 minutes in face to face encounter with the patient, out of which more than 50% of the time was spent in counseling patient or family and coordination of care. Counseling included but was not limited to time spent reviewing labs, imaging studies/ treatment plan and answering questions.

## 2021-01-27 ENCOUNTER — OFFICE VISIT (OUTPATIENT)
Dept: SURGERY | Age: 76
End: 2021-01-27
Payer: MEDICARE

## 2021-01-27 ENCOUNTER — HOSPITAL ENCOUNTER (OUTPATIENT)
Dept: WOMENS IMAGING | Age: 76
Discharge: HOME | End: 2021-01-27
Payer: MEDICARE

## 2021-01-27 VITALS — HEART RATE: 76 BPM | DIASTOLIC BLOOD PRESSURE: 82 MMHG | SYSTOLIC BLOOD PRESSURE: 130 MMHG

## 2021-01-27 DIAGNOSIS — C50.211 MALIGNANT NEOPLASM OF UPPER-INNER QUADRANT OF RIGHT BREAST IN FEMALE, ESTROGEN RECEPTOR POSITIVE (HCC): Primary | ICD-10-CM

## 2021-01-27 DIAGNOSIS — Z98.890 S/P LUMPECTOMY, RIGHT BREAST: ICD-10-CM

## 2021-01-27 DIAGNOSIS — Z85.3 PERSONAL HISTORY OF BREAST CANCER: ICD-10-CM

## 2021-01-27 DIAGNOSIS — Z17.0 MALIGNANT NEOPLASM OF UPPER-INNER QUADRANT OF RIGHT BREAST IN FEMALE, ESTROGEN RECEPTOR POSITIVE (HCC): Primary | ICD-10-CM

## 2021-01-27 PROCEDURE — 3017F COLORECTAL CA SCREEN DOC REV: CPT | Performed by: SURGERY

## 2021-01-27 PROCEDURE — G8484 FLU IMMUNIZE NO ADMIN: HCPCS | Performed by: SURGERY

## 2021-01-27 PROCEDURE — 1123F ACP DISCUSS/DSCN MKR DOCD: CPT | Performed by: SURGERY

## 2021-01-27 PROCEDURE — 1090F PRES/ABSN URINE INCON ASSESS: CPT | Performed by: SURGERY

## 2021-01-27 PROCEDURE — 1036F TOBACCO NON-USER: CPT | Performed by: SURGERY

## 2021-01-27 PROCEDURE — G8420 CALC BMI NORM PARAMETERS: HCPCS | Performed by: SURGERY

## 2021-01-27 PROCEDURE — 99213 OFFICE O/P EST LOW 20 MIN: CPT | Performed by: SURGERY

## 2021-01-27 PROCEDURE — G8400 PT W/DXA NO RESULTS DOC: HCPCS | Performed by: SURGERY

## 2021-01-27 PROCEDURE — G8428 CUR MEDS NOT DOCUMENT: HCPCS | Performed by: SURGERY

## 2021-01-27 PROCEDURE — 77066 DX MAMMO INCL CAD BI: CPT

## 2021-01-27 PROCEDURE — 4040F PNEUMOC VAC/ADMIN/RCVD: CPT | Performed by: SURGERY

## 2021-04-06 DIAGNOSIS — Z17.0 MALIGNANT NEOPLASM OF UPPER-INNER QUADRANT OF RIGHT BREAST IN FEMALE, ESTROGEN RECEPTOR POSITIVE (HCC): Primary | ICD-10-CM

## 2021-04-06 DIAGNOSIS — C50.211 MALIGNANT NEOPLASM OF UPPER-INNER QUADRANT OF RIGHT BREAST IN FEMALE, ESTROGEN RECEPTOR POSITIVE (HCC): Primary | ICD-10-CM

## 2021-04-06 RX ORDER — ANASTROZOLE 1 MG/1
1 TABLET ORAL DAILY
Qty: 90 TABLET | Refills: 3 | Status: SHIPPED | OUTPATIENT
Start: 2021-04-06 | End: 2022-04-18 | Stop reason: SDUPTHER

## 2021-07-23 NOTE — PROGRESS NOTES
HISTORY OF PRESENT ILLNESS:  Ms. Louis Moscoso  is a 76 y.o.   female who presents today for a 6 month breast exam.     She is status post right lumpectomy and sentinel node biopsy on 7/3/2018. This was following neoadjuvant chemotherapy for a 3.2  cm low grade invasive ductal carcinoma. ER/TN strongly positive. TN was strongly positive. HER-2 was negative by IHC. Ki-67 was 11% and borderline.     Delayed results demonstrated HER-2 as positive by FISH. Unfortunately the HER-2 positive by FISH was not available at that time and after discussion with Dr. Denys Bornson, we saw her back for consideration of neoadjuvant chemotherapy instead. Repeat HER-2 on her surgical specimen was indeterminate. She underwent right lumpectomy with sentinel node biopsy on 7/3/2018    PATHOLOGY REVEALS:  FINAL DIAGNOSIS:  A. Right breast, lumpectomy with sentinel lymph node biopsy and       ultrasound-guided wire localization:       Invasive ductal carcinoma, grade 1.       Greatest dimension of the residual invasive carcinoma is 1.9 cm.       Invasive carcinoma approaches to the closest anterior caudal margin       at 1 mm.       Ductal carcinoma in situ is present, cribriform and solid patterns,       nuclear grade 1.       The ductal carcinoma in situ approaches to the closest inferior       caudal margin at 10 mm.     B. Right breast, excision of additional caudal and medial margins:       Benign breast tissue.     C. Right breast, excision of additional deep margins:       Benign skeletal muscle and adipose tissue.     D. Lymph node, right axilla, sentinel lymph node biopsy: Benign lymph       node with fatty infiltration and partial hyalinization. She completed her radiation therapy. She is really doing very well. She is heading to [place] with her sisters which she says is always very special for her. She does not really like the anastrozole. She says it makes her tired.   I recommended to do her best to start some aerobic exercise. Mammogram-1/27/2021  FINDINGS:   Reidentified treatment changes in the upper right breast near 12:00. There are additional surgical clips noted in the right axilla. There   are a few bilateral benign-appearing calcifications. No new masses or   new suspicious calcifications are identified. There has been no   significant interval change. This study was interpreted with CAD. IMPRESSION AND RECOMMENDATION:    No mammographic evidence of malignancy. Recommendation is for the   patient to return for routine mammography in one year or sooner, if   clinically indicated. BI-RADS CATEGORY 2: BENIGN FINDINGS    The patient's information has been added to a reminder system with a   target due date for the next mammogram.   Signed by Dr Raissa Akbar on 1/27/2021 12:56 PM     She is still having a lot of problems with neuropathy in her hands. PHYSICAL EXAM:  The right lumpectomy incision is looking good. She has fibrocystic changes bilaterally and appropriate postoperative scarring on the right. There are no dominant masses, no skin or nipple changes, and no axillary adenopathy. There is no evidence of new or recurrent neoplasm. IMPRESSION:  Doing well s/p  right lumpectomy and sentinel node biopsy 7/3/2018. PLAN: I will see her back in six months for a physical exam following a bilateral Mammogram. She will call me with any new concerns. I have seen, examined and reviewed this patient medication list, appropriate labs and imaging studies. I reviewed relevant medical records and others physicians notes. I discussed the plans of care with the patient. I answered all the questions to the patients satisfaction. I, Dr Angela Deluca, personally performed the services described in this documentation as scribed by Lemuel Bah MA in my presence and is both accurate and complete.      (Please note that portions of this note were completed with a voice recognition program. Efforts were made to edit the dictations but occasionally words are mis-transcribed.)  Over 50% of the total visit time of 15 minutes in face to face encounter with the patient, out of which more than 50% of the time was spent in counseling patient or family and coordination of care. Counseling included but was not limited to time spent reviewing labs, imaging studies/ treatment plan and answering questions.

## 2021-07-28 ENCOUNTER — OFFICE VISIT (OUTPATIENT)
Dept: SURGERY | Age: 76
End: 2021-07-28
Payer: MEDICARE

## 2021-07-28 VITALS — HEART RATE: 76 BPM | SYSTOLIC BLOOD PRESSURE: 124 MMHG | DIASTOLIC BLOOD PRESSURE: 80 MMHG

## 2021-07-28 DIAGNOSIS — C50.211 MALIGNANT NEOPLASM OF UPPER-INNER QUADRANT OF RIGHT BREAST IN FEMALE, ESTROGEN RECEPTOR POSITIVE (HCC): ICD-10-CM

## 2021-07-28 DIAGNOSIS — Z17.0 MALIGNANT NEOPLASM OF UPPER-INNER QUADRANT OF RIGHT BREAST IN FEMALE, ESTROGEN RECEPTOR POSITIVE (HCC): ICD-10-CM

## 2021-07-28 DIAGNOSIS — Z85.3 PERSONAL HISTORY OF BREAST CANCER: Primary | ICD-10-CM

## 2021-07-28 DIAGNOSIS — Z98.890 S/P LUMPECTOMY, RIGHT BREAST: ICD-10-CM

## 2021-07-28 PROCEDURE — G8421 BMI NOT CALCULATED: HCPCS | Performed by: SURGERY

## 2021-07-28 PROCEDURE — 1090F PRES/ABSN URINE INCON ASSESS: CPT | Performed by: SURGERY

## 2021-07-28 PROCEDURE — 3017F COLORECTAL CA SCREEN DOC REV: CPT | Performed by: SURGERY

## 2021-07-28 PROCEDURE — 4040F PNEUMOC VAC/ADMIN/RCVD: CPT | Performed by: SURGERY

## 2021-07-28 PROCEDURE — G8400 PT W/DXA NO RESULTS DOC: HCPCS | Performed by: SURGERY

## 2021-07-28 PROCEDURE — G8428 CUR MEDS NOT DOCUMENT: HCPCS | Performed by: SURGERY

## 2021-07-28 PROCEDURE — 1123F ACP DISCUSS/DSCN MKR DOCD: CPT | Performed by: SURGERY

## 2021-07-28 PROCEDURE — 99213 OFFICE O/P EST LOW 20 MIN: CPT | Performed by: SURGERY

## 2021-07-28 PROCEDURE — 1036F TOBACCO NON-USER: CPT | Performed by: SURGERY

## 2022-01-25 NOTE — PROGRESS NOTES
HISTORY OF PRESENT ILLNESS:  Ms. Rachell Karimi  is a 68 y.o.   female who presents today for a 6 month breast exam.     She is status post right lumpectomy and sentinel node biopsy on 7/3/2018. This was following neoadjuvant chemotherapy for a 3.2  cm low grade invasive ductal carcinoma. ER/WA strongly positive. WA was strongly positive. HER-2 was negative by IHC. Ki-67 was 11% and borderline.     Delayed results demonstrated HER-2 as positive by FISH. Unfortunately the HER-2 positive by FISH was not available at that time and after discussion with Dr. Ector Levi, we saw her back for consideration of neoadjuvant chemotherapy instead. Repeat HER-2 on her surgical specimen was indeterminate. She underwent right lumpectomy with sentinel node biopsy on 7/3/2018    PATHOLOGY REVEALS:  FINAL DIAGNOSIS:  A. Right breast, lumpectomy with sentinel lymph node biopsy and       ultrasound-guided wire localization:       Invasive ductal carcinoma, grade 1.       Greatest dimension of the residual invasive carcinoma is 1.9 cm.       Invasive carcinoma approaches to the closest anterior caudal margin       at 1 mm.       Ductal carcinoma in situ is present, cribriform and solid patterns,       nuclear grade 1.       The ductal carcinoma in situ approaches to the closest inferior       caudal margin at 10 mm.     B. Right breast, excision of additional caudal and medial margins:       Benign breast tissue.     C. Right breast, excision of additional deep margins:       Benign skeletal muscle and adipose tissue.     D. Lymph node, right axilla, sentinel lymph node biopsy: Benign lymph       node with fatty infiltration and partial hyalinization. She completed her radiation therapy. She is really doing very well. She is heading to [place] with her sisters which she says is always very special for her. She does not really like the anastrozole. She says it makes her tired.   I recommended to do her best to start described in this documentation as scribed by Maye Carter MA in my presence and is both accurate and complete. (Please note that portions of this note were completed with a voice recognition program. Efforts were made to edit the dictations but occasionally words are mis-transcribed.)  Over 50% of the total visit time of 15 minutes in face to face encounter with the patient, out of which more than 50% of the time was spent in counseling patient or family and coordination of care. Counseling included but was not limited to time spent reviewing labs, imaging studies/ treatment plan and answering questions.

## 2022-01-27 ENCOUNTER — HOSPITAL ENCOUNTER (OUTPATIENT)
Dept: WOMENS IMAGING | Age: 77
Discharge: HOME | End: 2022-01-27
Payer: MEDICARE

## 2022-01-27 ENCOUNTER — OFFICE VISIT (OUTPATIENT)
Dept: SURGERY | Age: 77
End: 2022-01-27
Payer: MEDICARE

## 2022-01-27 VITALS
DIASTOLIC BLOOD PRESSURE: 78 MMHG | SYSTOLIC BLOOD PRESSURE: 138 MMHG | WEIGHT: 158.4 LBS | HEIGHT: 66 IN | BODY MASS INDEX: 25.46 KG/M2 | TEMPERATURE: 97.8 F

## 2022-01-27 DIAGNOSIS — Z85.3 PERSONAL HISTORY OF BREAST CANCER: ICD-10-CM

## 2022-01-27 DIAGNOSIS — Z98.890 S/P LUMPECTOMY, RIGHT BREAST: ICD-10-CM

## 2022-01-27 DIAGNOSIS — Z17.0 MALIGNANT NEOPLASM OF UPPER-INNER QUADRANT OF RIGHT BREAST IN FEMALE, ESTROGEN RECEPTOR POSITIVE (HCC): Primary | ICD-10-CM

## 2022-01-27 DIAGNOSIS — C50.211 MALIGNANT NEOPLASM OF UPPER-INNER QUADRANT OF RIGHT BREAST IN FEMALE, ESTROGEN RECEPTOR POSITIVE (HCC): Primary | ICD-10-CM

## 2022-01-27 PROCEDURE — 1123F ACP DISCUSS/DSCN MKR DOCD: CPT | Performed by: SURGERY

## 2022-01-27 PROCEDURE — 1090F PRES/ABSN URINE INCON ASSESS: CPT | Performed by: SURGERY

## 2022-01-27 PROCEDURE — 99213 OFFICE O/P EST LOW 20 MIN: CPT | Performed by: SURGERY

## 2022-01-27 PROCEDURE — G0279 TOMOSYNTHESIS, MAMMO: HCPCS

## 2022-01-27 PROCEDURE — 4040F PNEUMOC VAC/ADMIN/RCVD: CPT | Performed by: SURGERY

## 2022-01-27 PROCEDURE — G8417 CALC BMI ABV UP PARAM F/U: HCPCS | Performed by: SURGERY

## 2022-01-27 PROCEDURE — G8427 DOCREV CUR MEDS BY ELIG CLIN: HCPCS | Performed by: SURGERY

## 2022-01-27 PROCEDURE — G8400 PT W/DXA NO RESULTS DOC: HCPCS | Performed by: SURGERY

## 2022-01-27 PROCEDURE — 1036F TOBACCO NON-USER: CPT | Performed by: SURGERY

## 2022-01-27 PROCEDURE — G8484 FLU IMMUNIZE NO ADMIN: HCPCS | Performed by: SURGERY

## 2022-03-10 DIAGNOSIS — C50.211 MALIGNANT NEOPLASM OF UPPER-INNER QUADRANT OF RIGHT BREAST IN FEMALE, ESTROGEN RECEPTOR POSITIVE (HCC): ICD-10-CM

## 2022-03-10 DIAGNOSIS — Z17.0 MALIGNANT NEOPLASM OF UPPER-INNER QUADRANT OF RIGHT BREAST IN FEMALE, ESTROGEN RECEPTOR POSITIVE (HCC): ICD-10-CM

## 2022-03-11 RX ORDER — ANASTROZOLE 1 MG/1
TABLET ORAL
Qty: 90 TABLET | Refills: 3 | OUTPATIENT
Start: 2022-03-11

## 2022-03-28 ENCOUNTER — CLINICAL DOCUMENTATION (OUTPATIENT)
Dept: HEMATOLOGY | Age: 77
End: 2022-03-28

## 2022-03-28 NOTE — Clinical Note
Please contact MsElzbieta Agapito and schedule her a follow-up appointment with me, she has been lost to follow-up. Please see documentation on 3/28/2022.

## 2022-03-28 NOTE — PROGRESS NOTES
Spoke with Dr. Wiliam Cantor today indicating that Ms. Bernabe James voiced concern that her anastrozole was not being refilled and she was questioning whether she needed to continue, recommendation is to continue dosing through July 2023. After review of chart it appears that she was last seen by myself on 5/22/2022 and her follow-up appointment on 11/27/2020 was canceled, she has since been lost to follow-up. She reported to Dr. Wiliam Cantor that she has 67 pills left. Ms. Bernabe James will be contacted for a follow-up appointment. If she is in need of anastrozole prior to her appointment it would be appropriate to give her a refill of 30 days.

## 2022-04-11 DIAGNOSIS — C50.211 MALIGNANT NEOPLASM OF UPPER-INNER QUADRANT OF RIGHT BREAST IN FEMALE, ESTROGEN RECEPTOR POSITIVE (HCC): Primary | ICD-10-CM

## 2022-04-11 DIAGNOSIS — Z17.0 MALIGNANT NEOPLASM OF UPPER-INNER QUADRANT OF RIGHT BREAST IN FEMALE, ESTROGEN RECEPTOR POSITIVE (HCC): Primary | ICD-10-CM

## 2022-04-11 NOTE — PROGRESS NOTES
Progress Note      Pt Name: William Quezada: 1945  MRN: [id number]    Date of evaluation: 4/12/2022  History Obtained From:  patient, electronic medical record    CHIEF COMPLAINT:    Chief Complaint   Patient presents with    Follow-up     Malignant neoplasm of upper-inner quadrant of right breast in female, estrogen receptor positive ([facility] [address]     HISTORY OF PRESENT ILLNESS:    John Benito is a 68 y.o.  female who is currently being followed for invasive mammary carcinoma the right breast.  She is currently taking adjuvant aromatase inhibitor therapy with Arimidex 1 mg p.o. daily. Sarah Beth Hays returns today in scheduled follow-up for evaluation, lab monitoring, side effect monitoring and further treatment recommendations. She presented today indicating that she is compliant with the Arimidex 1 mg daily and denies any new breast complaints. She denies experiencing hot flashes, although she has noted some increased arthralgias in her elbows and hands, currently tolerable. She declined breast examination today.     Britney was seen in scheduled follow-up by Dr Sheba Avendano on 01/27/2022, I reviewed the progress note from that office visit which documented bilateral breast exam with no new findings or concerns of recurrence. Lab results reviewed with patient today and are documented below. ONCOLOGIC HISTORY:     Diagnosis  · Right invasive mammary carcinoma, December 2017   · ER 99%, AK 56%, HER-2/adeline IHC 1+/FISH amplified, Ki-67 11%   · [id number]   · 9 mm noncalcified nodule in the right upper lobe, documented on 1/24/2018 and stable on 5/11/2018. · Normal bone densityBMD November 2017     Treatment summary  · wCPHP- 12 weekly doses of carboplatin AUC 2/Taxol with Herceptin and Perjeta every 3 weeks for a total of one year dosing initiated on 1/26/2018. 12 weekly doses of carboplatin/Taxol given on 4/27/2018.  Herceptin/Perjeta was discontinued after 9 cycles · 7/3/2018right lumpectomy/sentinel lymph node biopsy   · 7/21/2018Anastrozole ×5 years   · 8/13/2018 through 9/20/2018 61 cGy adjuvant RT     Ms Lola Sandhu was seen in initial oncology consultation on 1/10/2018 referred by Dr. Dion Peterson for a diagnosis breast cancer. · 11/15/2017nick underwent a diagnostic mammogram/US of the breast for a complaint of a lump in the right breast. He was consistent with a 2.2 x 0.9 x 1.6 cm spiculated mass at 12 o'clock position of the right breast.   · 12/4/2017nick underwent a core needle biopsy of the right breast lesion revealing invasive mammary carcinoma, grade 1 with associated low-grade DCIS. ER 99%, MA 66%, HER-2/adeline IHC 1+/FISH amplified, Ki-67 11%   · 12/15/2017MRI bilateral breast showed a 3 x 2.1, 2.1 cm mass in the right breast. Areas of non-mass enhancement in the lower outer left breast at the 4 to 5 o'clock position. Second look ultrasound recommended. No internal mammary or axillary adenopathy identified. · 12/27/2017negative ultrasound the left breast.   · 1/10/2018nick was first seen in initial oncology consultation by Dr. Karsten Fletcher. She was recommended neoadjuvant chemotherapy with weekly paclitaxel/carboplatin, Herceptin, Perjeta regimen for 18 weeks followed by Herceptin/Perjeta to complete 1 year of treatment. · 1/24/2018- nuclear bone scan documented areas of ill-defined increased activity involving both shoulder joints, sternum, thoracolumbar spine, feet, and knee joints, these most likely represent chronic degenerative process. There is less likelihood of the bony metastatic disease. · 1/24/2018- CT scan of the abdomen and pelvis documented no evidence of metastatic disease. A rotated right kidney and a left renal cyst are documented. · 1/24/2018. CT scan of the chest documented a 9 mm noncalcified nodule in the right upper lobe. Etiology is not certain and follow-up is recommended in 3-6 months.  No evidence of mediastinal or hilar lymphadenopathy. · 1/24/2018- 2-D echocardiogram documented a normal left ventricular function with an estimated ejection fraction of 55-60%. · 1/26/2018 - initiation of neoadjuvant chemotherapy with weekly carboplatin AUC 2/Taxol with Herceptin and Perjeta every 3 weeks. 12 weekly doses of carboplatin/Taxol given on 4/27/2018. Herceptin and Perjeta continue until completion of 1 year total dosing. · 3/19/2018- Ultrasound of the right breast documented mild interval decrease in size of the malignant mass in the upper inner quadrant of the right breast measuring 3.1 x 1.3 x 1.7 cm and previously measured 3.7 x 1.3 x 2.3 cm. · 4/26/2018- CT scan of the abdomen and pelvis without contrast obtained for flank pain, documented an oval, well circumscribed, homogeneous fluid density mass that represents a benign cyst. The right kidney is abnormally rotated and contains a single tiny non-obstructing nephrolith. · 5/11/2018- CT scan of the chest with contrast documented a stable noncalcified nodule in the RUL measuring 4 mm x 3 mm. A calcified granuloma in RML lobe. Six-month follow-up is recommended. · 5/30/2018repeat 2-D echo EF 55-6%. · 6/1/2018MRI of the breast and ultrasound showed further interval response to treatment with tumor now measuring 2 x 1.6 cm (previously 2.1 x 2.5 cm). · 7/3/2018she underwent a right lumpectomy and sentinel lymph node biopsy revealing a revealed well invasive ductal carcinoma, grade 1, measuring 1.9 cm., Associated DCIS. 2 sentinel lymph nodes negative for metastatic disease. Final pathologic staging [id number])M0. Margins negative. Repeated ER 99%, VA 54%, HER-2/adeline IHC 1+/FISH ratio 2.0 and her-2 signals 2/per nucleus, Ki-67 10%. Therefore, the results of her HER-2 negative as per new ASCO guidelines, May 2018. · 7/21/2018shkin presents here today for continuation of Herceptin and Perjeta adjuvant.  I will discuss with the pathology department about the findings of her HER-2/adeline status in the light of the new guidelines. If she is considered to be negative, then we will discontinue Perjeta and Herceptin and continue with adjuvant endocrine therapy only. Referral given for radiation. · 8/10/2018Herceptin, Perjeta with discontinued as per HER-2 negative according to the new HER-2/adeline guidelines. · 8/13/2018 through 9/20/2018 61 cGy adjuvant RT by Dr. Pamella Padilla. · 01/17/2019 Bilateral mammogram- No mammographic evidence of malignancy in the left breast. .  8 mm asymmetry in the posterior depth of the right breast, slightly remote from the surgical bed. This is probably benign as it is not confirmed on additional images. BI-RADS Final Assessment Category 3: Probably benign. · 04/22/2019 Right mammogram- No suspicious findings are seen in the right breast, there are post therapeutic changes as before  · 05/20/2019 CT chest with contrast- Stable noncalcified nodule right upper lobe. No new pulmonary abnormalities are identified. Coronary calcifications. Postsurgical change in the right breast and right axilla. · 01/20/2020 Bilateral mammogram- No mammographic or targeted ultrasonographic evidence of malignancy. Recommend annual mammography. · 01/20/2020 Ultrasound left breast- No mammographic or targeted ultrasonographic evidence of malignancy  · 06/30/2020 CT chest with contrast- Stable right pulmonary nodules. No developing lung lesions. No acute cardiopulmonary process.   · 01/27/2021 Bilateral mammogram- no mammographic evidence of malignancy     Past Medical History:    Past Medical History:   Diagnosis Date    Arthritis     Breast cancer ([address] 2018    infiltrating ductal carcinoma    Cancer ([address]     breast    H/O screening mammography 06/01/2018    BR6    History of therapeutic radiation 2018    infiltrating ductal carcinoma    Hx antineoplastic chemo 2018    infiltrating ductal carcinoma    Hyperlipidemia     Palpitations     Post-menopausal     Prolonged emergence from general anesthesia        Past Surgical History:    Past Surgical History:   Procedure Laterality Date    BREAST BIOPSY Right 2018    infiltrating ductal carcinoma    BREAST LUMPECTOMY Right 2018    infiltrating ductal carcinoma    CATHETER REMOVAL N/A 5/16/2019    PORT REMOVAL performed by Og Canela MD at [facility]  2018    [address]    JOINT REPLACEMENT Left 2017    left knee/ toe joints    OVARY REMOVAL Left 1993    MT INSJ PRPH CTR VAD W/SUBQ PORT AGE 5 YR/> N/A 1/11/2018    PORT INSERTION performed by Og Canela MD at [address] [facility] [address], PARTIAL Right 7/3/2018    LUMPECTOMY W/SNB AND INTRAOP US GUIDED NL performed by Og Canela MD at [address] [facility] TOE SURGERY Bilateral [id number]       Current Medications:    Current Outpatient Medications   Medication Sig Dispense Refill    anastrozole (ARIMIDEX) 1 MG tablet Take 1 tablet by mouth daily 90 tablet 3    meloxicam (MOBIC) 15 MG tablet 1 tablet      gabapentin (NEURONTIN) 100 MG capsule Take 100 mg by mouth daily. Lyndsey Flavio pravastatin (PRAVACHOL) 40 MG tablet Take 20 mg by mouth daily       metoprolol succinate (TOPROL XL) 25 MG extended release tablet Take 25 mg by mouth nightly       Omega-3 Fatty Acids (FISH OIL) 1200 MG CAPS Take 1 capsule by mouth 2 times daily       Multiple Vitamin (MULTI-VITAMIN DAILY PO) Take 1 tablet by mouth daily       calcium carbonate (OSCAL) 500 MG TABS tablet Take 500 mg by mouth daily      vitamin C (ASCORBIC ACID) 500 MG tablet Take 500 mg by mouth daily      BIOTIN FORTE PO Take 1 tablet by mouth daily       Magic Mouthwash (MIRACLE MOUTHWASH) Swish and spit 5 mLs 4 times daily as needed for Irritation      hydrocortisone 2.5 % cream Apply topically 2 times daily Apply topically 2 times daily. No current facility-administered medications for this visit.         Allergies: No Known Allergies    Social History:    Social History     Tobacco Use    Smoking status: Never Smoker    Smokeless tobacco: Never Used   Substance Use Topics    Alcohol use: No    Drug use: No       Family History:   Family History   Problem Relation Age of Onset    Breast Cancer Mother [de-identified]    Heart Disease Mother        Vitals:  Vitals:    04/12/22 1429   BP: (!) 136/96   Pulse: 109   SpO2: 98%   Weight: 158 lb 3.2 oz (71.8 kg)   Height: 5' 6\" (1.676 m)        Subjective   REVIEW OF SYSTEMS:   Review of Systems   Constitutional: Negative. Negative for chills, diaphoresis and fever. HENT: Negative. Negative for congestion, ear pain, hearing loss, nosebleeds, sore throat and tinnitus. Eyes: Negative. Negative for pain, discharge and redness. Respiratory: Negative. Negative for cough, shortness of breath and wheezing. Cardiovascular: Negative. Negative for chest pain, palpitations and leg swelling. Gastrointestinal: Positive for constipation. Negative for abdominal pain, blood in stool, diarrhea, nausea and vomiting. Endocrine: Negative for polydipsia. Genitourinary: Negative for dysuria, flank pain, frequency, hematuria and urgency. Musculoskeletal: Positive for arthralgias. Negative for back pain, myalgias and neck pain. Skin: Negative. Negative for rash. Neurological: Negative. Negative for dizziness, tremors, seizures, weakness and headaches. Hematological: Does not bruise/bleed easily. Psychiatric/Behavioral: Negative. The patient is not nervous/anxious. Objective   PHYSICAL EXAM:  Physical Exam  Vitals reviewed. Constitutional:       General: She is not in acute distress. Appearance: She is well-developed. HENT:      Head: Normocephalic and atraumatic. Mouth/Throat:      Pharynx: Uvula midline. Tonsils: No tonsillar exudate. Eyes:      General: Lids are normal.      Conjunctiva/sclera: Conjunctivae normal.      Pupils: Pupils are equal, round, and reactive to light. Neck:      Thyroid: No thyroid mass or thyromegaly. Vascular: No JVD. Trachea: Trachea normal. No tracheal deviation. Cardiovascular:      Rate and Rhythm: Normal rate and regular rhythm. Pulses: Normal pulses. Heart sounds: Normal heart sounds. Pulmonary:      Effort: Pulmonary effort is normal. No respiratory distress. Breath sounds: Normal breath sounds. No wheezing or rales. Chest:      Chest wall: No tenderness. Abdominal:      General: Bowel sounds are normal. There is no distension. Palpations: Abdomen is soft. There is no mass. Tenderness: There is no abdominal tenderness. There is no guarding. Musculoskeletal:         General: No tenderness or deformity. Cervical back: Normal range of motion and neck supple. Comments: Range of motion within normal limits x4 extremities   Skin:     General: Skin is warm. Findings: No bruising, erythema or rash. Neurological:      Mental Status: She is alert and oriented to person, place, and time. Cranial Nerves: No cranial nerve deficit. Coordination: Coordination normal.   Psychiatric:         Behavior: Behavior normal.         Thought Content: Thought content normal.         Labs reviewed today:  Lab Results   Component Value Date    WBC 5.90 04/12/2022    HGB 13.8 04/12/2022    HCT 42.3 04/12/2022    MCV 92.8 04/12/2022     (L) 04/12/2022         ASSESSMENT/PLAN:      1. Malignant neoplasm of upper-inner quadrant of right breast in female, estrogen receptor positive [facility]), December 2017. Currently taking adjuvant aromatase inhibitor therapy with Arimidex 1 mg daily, anticipate continued dosing until 7/21/2023. Mammogram up-to-date and without evidence of malignancy. Denies any breast complaints, declined breast examination. Israel Torres was seen in scheduled follow-up by Dr Alexsander Moran on 01/27/2022, I reviewed the progress note from that office visit which documented bilateral breast exam with no new findings or concerns of recurrence.    -Continue Arimidex 1 mg daily  -Keep scheduled follow-up appointment with Dr. Annamaria Sutton     I discussed the importance of compliance with Arimidex. Decreased compliance with adjuvant aromatase inhibitor therapy has been linked to decreased survival. We discussed the various barriers and side effects of aromatase inhibitor therapy and ways to improve compliance. She acknowledged understanding. 2.  Encounter for monitoring aromatase inhibitor therapy, Arimidex  -Denies any significant hot flashes  -Noted increased arthralgias specifically to the elbows and hands since initiating Arimidex, currently tolerable    3. Incidental lung nodule suspected to be benign, initially measuring 9 mm in the RUL on 1/24/2018, measured 6 mm on 5/20/2019 and measured 7 mm on 6/3/2020. The nodule is considered to be benign with no growth over 29 months. She is asymptomatic denying any respiratory complaints. No further work-up warranted unless she becomes symptomatic.     4. 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 Reports neuropathy to feet is stable with no change from previous evaluation.      5. At risk for osteopenia. Bone mineral density study November 2017 was within normal limits, reports currently taking calcium 600 mg p.o. daily with vitamin D 500 units.    -Encouraged to increase calcium supplement 1200 mg with vitamin D 1000 units daily       6.  Bone health: Bone mineral density study in November 2017 was normal.   indicated not to repeat bone mineral study until July 2021.  -Continue calcium supplement  -Schedule repeat bone mineral density study    I discussed all of the above findings included in the assessment and plan with the patient and the patient is in agreement to move forward with current recommendations/treatment. I have addressed all of their questions and concerns that were verbalized. FOLLOW UP:  1. Follow up given for 6 months or sooner, if needed  2. Continue to follow with other medical providers as recommended  3. Labs at next visit: CBC. CMP and vitamin D level if not recently evaluated    EMR Dragon/Transcription disclaimer:   Much of this encounter note is an electronic transcription/translation of spoken language to printed text. The electronic translation of spoken language may permit erroneous, or at times, nonsensical words or phrases to be inadvertently transcribed; although attempts have made to review the note for such errors, some may still exist.  Please excuse any unrecognized transcription errors and contact us if the air is unintelligible or needs documented correction. Also, portions of this note have been copied forward, however, changed to reflect the most current clinical status of this patient. Electronically signed by TOMA Devine on 4/17/2022 at 7:16 PM  Shan APARICIO am pre-charting as a registered nurse for TOMA Lewis.

## 2022-04-12 ENCOUNTER — HOSPITAL ENCOUNTER (OUTPATIENT)
Dept: INFUSION THERAPY | Age: 77
Discharge: HOME | End: 2022-04-12
Payer: MEDICARE

## 2022-04-12 ENCOUNTER — OFFICE VISIT (OUTPATIENT)
Dept: HEMATOLOGY | Age: 77
End: 2022-04-12
Payer: MEDICARE

## 2022-04-12 VITALS
WEIGHT: 158.2 LBS | SYSTOLIC BLOOD PRESSURE: 136 MMHG | HEART RATE: 109 BPM | BODY MASS INDEX: 25.43 KG/M2 | OXYGEN SATURATION: 98 % | HEIGHT: 66 IN | DIASTOLIC BLOOD PRESSURE: 96 MMHG

## 2022-04-12 DIAGNOSIS — R91.1 INCIDENTAL LUNG NODULE, > 3MM AND < 8MM: ICD-10-CM

## 2022-04-12 DIAGNOSIS — C50.211 MALIGNANT NEOPLASM OF UPPER-INNER QUADRANT OF RIGHT BREAST IN FEMALE, ESTROGEN RECEPTOR POSITIVE (HCC): ICD-10-CM

## 2022-04-12 DIAGNOSIS — T45.1X5A PERIPHERAL NEUROPATHY DUE TO CHEMOTHERAPY (HCC): ICD-10-CM

## 2022-04-12 DIAGNOSIS — Z17.0 MALIGNANT NEOPLASM OF UPPER-INNER QUADRANT OF RIGHT BREAST IN FEMALE, ESTROGEN RECEPTOR POSITIVE (HCC): Primary | ICD-10-CM

## 2022-04-12 DIAGNOSIS — Z79.811 ENCOUNTER FOR MONITORING AROMATASE INHIBITOR THERAPY: ICD-10-CM

## 2022-04-12 DIAGNOSIS — Z17.0 MALIGNANT NEOPLASM OF UPPER-INNER QUADRANT OF RIGHT BREAST IN FEMALE, ESTROGEN RECEPTOR POSITIVE (HCC): ICD-10-CM

## 2022-04-12 DIAGNOSIS — Z78.0 POST-MENOPAUSAL: ICD-10-CM

## 2022-04-12 DIAGNOSIS — Z51.81 ENCOUNTER FOR MONITORING AROMATASE INHIBITOR THERAPY: ICD-10-CM

## 2022-04-12 DIAGNOSIS — G62.0 PERIPHERAL NEUROPATHY DUE TO CHEMOTHERAPY (HCC): ICD-10-CM

## 2022-04-12 DIAGNOSIS — C50.211 MALIGNANT NEOPLASM OF UPPER-INNER QUADRANT OF RIGHT BREAST IN FEMALE, ESTROGEN RECEPTOR POSITIVE (HCC): Primary | ICD-10-CM

## 2022-04-12 LAB
HCT VFR BLD CALC: 42.3 % (ref 34.1–44.9)
HEMOGLOBIN: 13.8 G/DL (ref 11.2–15.7)
MCH RBC QN AUTO: 30.3 PG (ref 25.6–32.2)
MCHC RBC AUTO-ENTMCNC: 32.6 G/DL (ref 32.3–35.5)
MCV RBC AUTO: 92.8 FL (ref 79.4–94.8)
PDW BLD-RTO: 12.2 % (ref 11.7–14.4)
PLATELET # BLD: 171 K/UL (ref 182–369)
PMV BLD AUTO: 8.9 FL (ref 7.4–10.4)
RBC # BLD: 4.56 M/UL (ref 3.93–5.22)
WBC # BLD: 5.9 K/UL (ref 3.98–10.04)

## 2022-04-12 PROCEDURE — 36415 COLL VENOUS BLD VENIPUNCTURE: CPT

## 2022-04-12 PROCEDURE — 1036F TOBACCO NON-USER: CPT | Performed by: NURSE PRACTITIONER

## 2022-04-12 PROCEDURE — G8427 DOCREV CUR MEDS BY ELIG CLIN: HCPCS | Performed by: NURSE PRACTITIONER

## 2022-04-12 PROCEDURE — 4040F PNEUMOC VAC/ADMIN/RCVD: CPT | Performed by: NURSE PRACTITIONER

## 2022-04-12 PROCEDURE — G8400 PT W/DXA NO RESULTS DOC: HCPCS | Performed by: NURSE PRACTITIONER

## 2022-04-12 PROCEDURE — 1090F PRES/ABSN URINE INCON ASSESS: CPT | Performed by: NURSE PRACTITIONER

## 2022-04-12 PROCEDURE — 85027 COMPLETE CBC AUTOMATED: CPT

## 2022-04-12 PROCEDURE — G8417 CALC BMI ABV UP PARAM F/U: HCPCS | Performed by: NURSE PRACTITIONER

## 2022-04-12 PROCEDURE — 1123F ACP DISCUSS/DSCN MKR DOCD: CPT | Performed by: NURSE PRACTITIONER

## 2022-04-12 PROCEDURE — 99213 OFFICE O/P EST LOW 20 MIN: CPT | Performed by: NURSE PRACTITIONER

## 2022-04-12 PROCEDURE — 99212 OFFICE O/P EST SF 10 MIN: CPT

## 2022-04-12 ASSESSMENT — ENCOUNTER SYMPTOMS: CONSTIPATION: 1

## 2022-04-17 ASSESSMENT — ENCOUNTER SYMPTOMS
COUGH: 0
VOMITING: 0
EYE REDNESS: 0
EYE DISCHARGE: 0
BACK PAIN: 0
EYES NEGATIVE: 1
ABDOMINAL PAIN: 0
EYE PAIN: 0
BLOOD IN STOOL: 0
WHEEZING: 0
RESPIRATORY NEGATIVE: 1
DIARRHEA: 0
SORE THROAT: 0
NAUSEA: 0
SHORTNESS OF BREATH: 0

## 2022-04-18 DIAGNOSIS — Z17.0 MALIGNANT NEOPLASM OF UPPER-INNER QUADRANT OF RIGHT BREAST IN FEMALE, ESTROGEN RECEPTOR POSITIVE (HCC): ICD-10-CM

## 2022-04-18 DIAGNOSIS — C50.211 MALIGNANT NEOPLASM OF UPPER-INNER QUADRANT OF RIGHT BREAST IN FEMALE, ESTROGEN RECEPTOR POSITIVE (HCC): ICD-10-CM

## 2022-04-18 RX ORDER — ANASTROZOLE 1 MG/1
1 TABLET ORAL DAILY
Qty: 90 TABLET | Refills: 3 | Status: SHIPPED | OUTPATIENT
Start: 2022-04-18

## 2022-08-02 NOTE — PROGRESS NOTES
HISTORY OF PRESENT ILLNESS:  Ms. Julienne Lopez  is a 68 y.o.   female who presents today for a 6 month breast exam.     She is status post right lumpectomy and sentinel node biopsy on 7/3/2018. This was following neoadjuvant chemotherapy for a 3.2  cm low grade invasive ductal carcinoma. ER/NV strongly positive. NV was strongly positive. HER-2 was negative by IHC. Ki-67 was 11% and borderline. Delayed results demonstrated HER-2 as positive by FISH. Unfortunately the HER-2 positive by FISH was not available at that time and after discussion with Dr. Pauline Fowler, we saw her back for consideration of neoadjuvant chemotherapy instead. Repeat HER-2 on her surgical specimen was indeterminate. She underwent right lumpectomy with sentinel node biopsy on 7/3/2018    PATHOLOGY REVEALS:  FINAL DIAGNOSIS:  A. Right breast, lumpectomy with sentinel lymph node biopsy and       ultrasound-guided wire localization:       Invasive ductal carcinoma, grade 1. Greatest dimension of the residual invasive carcinoma is 1.9 cm. Invasive carcinoma approaches to the closest anterior caudal margin       at 1 mm. Ductal carcinoma in situ is present, cribriform and solid patterns,       nuclear grade 1. The ductal carcinoma in situ approaches to the closest inferior       caudal margin at 10 mm.     B. Right breast, excision of additional caudal and medial margins:       Benign breast tissue. C. Right breast, excision of additional deep margins:       Benign skeletal muscle and adipose tissue. D. Lymph node, right axilla, sentinel lymph node biopsy: Benign lymph       node with fatty infiltration and partial hyalinization. She completed her radiation therapy. She is really doing very well. She is heading to [place] with her sisters which she says is always very special for her. She does not really like the anastrozole. She says it makes her tired.   I recommended to do her best to start Lynette Garcia MA in my presence and is both accurate and complete. (Please note that portions of this note were completed with a voice recognition program. Efforts were made to edit the dictations but occasionally words are mis-transcribed.)  Over 50% of the total visit time of 15 minutes in face to face encounter with the patient, out of which more than 50% of the time was spent in counseling patient or family and coordination of care. Counseling included but was not limited to time spent reviewing labs, imaging studies/ treatment plan and answering questions.

## 2022-08-03 ENCOUNTER — OFFICE VISIT (OUTPATIENT)
Dept: SURGERY | Age: 77
End: 2022-08-03
Payer: MEDICARE

## 2022-08-03 VITALS — DIASTOLIC BLOOD PRESSURE: 84 MMHG | SYSTOLIC BLOOD PRESSURE: 138 MMHG | HEART RATE: 80 BPM

## 2022-08-03 DIAGNOSIS — C50.211 MALIGNANT NEOPLASM OF UPPER-INNER QUADRANT OF RIGHT BREAST IN FEMALE, ESTROGEN RECEPTOR POSITIVE (HCC): Primary | ICD-10-CM

## 2022-08-03 DIAGNOSIS — Z17.0 MALIGNANT NEOPLASM OF UPPER-INNER QUADRANT OF RIGHT BREAST IN FEMALE, ESTROGEN RECEPTOR POSITIVE (HCC): Primary | ICD-10-CM

## 2022-08-03 DIAGNOSIS — Z98.890 S/P LUMPECTOMY, RIGHT BREAST: ICD-10-CM

## 2022-08-03 PROCEDURE — 1036F TOBACCO NON-USER: CPT | Performed by: SURGERY

## 2022-08-03 PROCEDURE — 99213 OFFICE O/P EST LOW 20 MIN: CPT | Performed by: SURGERY

## 2022-08-03 PROCEDURE — 1090F PRES/ABSN URINE INCON ASSESS: CPT | Performed by: SURGERY

## 2022-08-03 PROCEDURE — G8400 PT W/DXA NO RESULTS DOC: HCPCS | Performed by: SURGERY

## 2022-08-03 PROCEDURE — 1123F ACP DISCUSS/DSCN MKR DOCD: CPT | Performed by: SURGERY

## 2022-08-03 PROCEDURE — G8417 CALC BMI ABV UP PARAM F/U: HCPCS | Performed by: SURGERY

## 2022-08-03 PROCEDURE — G8427 DOCREV CUR MEDS BY ELIG CLIN: HCPCS | Performed by: SURGERY

## 2022-08-11 DIAGNOSIS — Z85.3 PERSONAL HISTORY OF BREAST CANCER: Primary | ICD-10-CM

## 2022-10-12 ENCOUNTER — HOSPITAL ENCOUNTER (OUTPATIENT)
Dept: INFUSION THERAPY | Age: 77
Discharge: HOME | End: 2022-10-12
Payer: MEDICARE

## 2022-10-12 ENCOUNTER — OFFICE VISIT (OUTPATIENT)
Dept: HEMATOLOGY | Age: 77
End: 2022-10-12
Payer: MEDICARE

## 2022-10-12 VITALS
WEIGHT: 145 LBS | BODY MASS INDEX: 23.3 KG/M2 | SYSTOLIC BLOOD PRESSURE: 136 MMHG | HEIGHT: 66 IN | OXYGEN SATURATION: 96 % | HEART RATE: 78 BPM | DIASTOLIC BLOOD PRESSURE: 72 MMHG

## 2022-10-12 DIAGNOSIS — T45.1X5A PERIPHERAL NEUROPATHY DUE TO CHEMOTHERAPY (HCC): ICD-10-CM

## 2022-10-12 DIAGNOSIS — Z17.0 MALIGNANT NEOPLASM OF UPPER-INNER QUADRANT OF RIGHT BREAST IN FEMALE, ESTROGEN RECEPTOR POSITIVE (HCC): Primary | ICD-10-CM

## 2022-10-12 DIAGNOSIS — Z78.0 POST-MENOPAUSAL: ICD-10-CM

## 2022-10-12 DIAGNOSIS — C50.211 MALIGNANT NEOPLASM OF UPPER-INNER QUADRANT OF RIGHT BREAST IN FEMALE, ESTROGEN RECEPTOR POSITIVE (HCC): Primary | ICD-10-CM

## 2022-10-12 DIAGNOSIS — G62.0 PERIPHERAL NEUROPATHY DUE TO CHEMOTHERAPY (HCC): ICD-10-CM

## 2022-10-12 DIAGNOSIS — Z51.81 ENCOUNTER FOR MONITORING AROMATASE INHIBITOR THERAPY: ICD-10-CM

## 2022-10-12 DIAGNOSIS — C50.211 MALIGNANT NEOPLASM OF UPPER-INNER QUADRANT OF RIGHT BREAST IN FEMALE, ESTROGEN RECEPTOR POSITIVE (HCC): ICD-10-CM

## 2022-10-12 DIAGNOSIS — Z79.811 ENCOUNTER FOR MONITORING AROMATASE INHIBITOR THERAPY: ICD-10-CM

## 2022-10-12 DIAGNOSIS — Z17.0 MALIGNANT NEOPLASM OF UPPER-INNER QUADRANT OF RIGHT BREAST IN FEMALE, ESTROGEN RECEPTOR POSITIVE (HCC): ICD-10-CM

## 2022-10-12 LAB
BASOPHILS ABSOLUTE: 0.03 K/UL (ref 0.01–0.08)
BASOPHILS RELATIVE PERCENT: 0.5 % (ref 0.1–1.2)
EOSINOPHILS ABSOLUTE: 0.14 K/UL (ref 0.04–0.54)
EOSINOPHILS RELATIVE PERCENT: 2.2 % (ref 0.7–7)
HCT VFR BLD CALC: 45.2 % (ref 34.1–44.9)
HEMOGLOBIN: 13.8 G/DL (ref 11.2–15.7)
LYMPHOCYTES ABSOLUTE: 1.12 K/UL (ref 1.18–3.74)
LYMPHOCYTES RELATIVE PERCENT: 17.5 % (ref 19.3–53.1)
MCH RBC QN AUTO: 30.2 PG (ref 25.6–32.2)
MCHC RBC AUTO-ENTMCNC: 30.5 G/DL (ref 32.3–35.5)
MCV RBC AUTO: 98.9 FL (ref 79.4–94.8)
MONOCYTES ABSOLUTE: 0.37 K/UL (ref 0.24–0.82)
MONOCYTES RELATIVE PERCENT: 5.8 % (ref 4.7–12.5)
NEUTROPHILS ABSOLUTE: 4.72 K/UL (ref 1.56–6.13)
NEUTROPHILS RELATIVE PERCENT: 73.8 % (ref 34–71.1)
PDW BLD-RTO: 13.1 % (ref 11.7–14.4)
PLATELET # BLD: 160 K/UL (ref 182–369)
PMV BLD AUTO: 10.6 FL (ref 7.4–10.4)
RBC # BLD: 4.57 M/UL (ref 3.93–5.22)
WBC # BLD: 6.39 K/UL (ref 3.98–10.04)

## 2022-10-12 PROCEDURE — 85025 COMPLETE CBC W/AUTO DIFF WBC: CPT

## 2022-10-12 PROCEDURE — 36415 COLL VENOUS BLD VENIPUNCTURE: CPT

## 2022-10-12 PROCEDURE — 99211 OFF/OP EST MAY X REQ PHY/QHP: CPT

## 2022-10-12 PROCEDURE — G8400 PT W/DXA NO RESULTS DOC: HCPCS | Performed by: NURSE PRACTITIONER

## 2022-10-12 PROCEDURE — 1036F TOBACCO NON-USER: CPT | Performed by: NURSE PRACTITIONER

## 2022-10-12 PROCEDURE — G8420 CALC BMI NORM PARAMETERS: HCPCS | Performed by: NURSE PRACTITIONER

## 2022-10-12 PROCEDURE — G8427 DOCREV CUR MEDS BY ELIG CLIN: HCPCS | Performed by: NURSE PRACTITIONER

## 2022-10-12 PROCEDURE — 1090F PRES/ABSN URINE INCON ASSESS: CPT | Performed by: NURSE PRACTITIONER

## 2022-10-12 PROCEDURE — 1123F ACP DISCUSS/DSCN MKR DOCD: CPT | Performed by: NURSE PRACTITIONER

## 2022-10-12 PROCEDURE — G8484 FLU IMMUNIZE NO ADMIN: HCPCS | Performed by: NURSE PRACTITIONER

## 2022-10-12 PROCEDURE — 99213 OFFICE O/P EST LOW 20 MIN: CPT | Performed by: NURSE PRACTITIONER

## 2022-10-12 ASSESSMENT — ENCOUNTER SYMPTOMS
COUGH: 0
SORE THROAT: 0
GASTROINTESTINAL NEGATIVE: 1
EYE DISCHARGE: 0
EYE PAIN: 0
ABDOMINAL PAIN: 0
BLOOD IN STOOL: 0
NAUSEA: 0
EYE REDNESS: 0
RESPIRATORY NEGATIVE: 1
DIARRHEA: 0
CONSTIPATION: 0
EYES NEGATIVE: 1
SHORTNESS OF BREATH: 0
WHEEZING: 0
VOMITING: 0
BACK PAIN: 0

## 2022-10-12 NOTE — PROGRESS NOTES
Progress Note      Pt Name: Lisa Starch: 1945  MRN: [id number]    Date of evaluation: 10/12/2022  History Obtained From:  patient, electronic medical record    CHIEF COMPLAINT:    Chief Complaint   Patient presents with    Follow-up     Malignant neoplasm of upper-inner quadrant of right breast in female, estrogen receptor positive ([address]     HISTORY OF PRESENT ILLNESS:    Hood Valverde is a 68 y.o.  female who is currently being followed for invasive mammary carcinoma the right breast, ER/VT positive HER2/adeline negative, December 2017. Current recommendation is for adjuvant aromatase inhibitor therapy with Arimidex 1 mg p.o. daily for 5 years, July 2023. Dedrick Flank returns today in scheduled follow-up for evaluation, lab monitoring, side effect monitoring and further treatment recommendations. Today's clinic visit to include physical assessment, review of systems, any lab or radiographic findings that were available and plan of care are documented below. ONCOLOGIC HISTORY:     Diagnosis  Right invasive mammary carcinoma, December 2017   ER 99%, VT 56%, HER-2/adeline IHC 1+/FISH amplified, Ki-67 11%   [id number]   9 mm noncalcified nodule in the right upper lobe, documented on 1/24/2018 and stable on 5/11/2018. Normal bone density-BMD November 2017     Treatment summary  wCPHP- 12 weekly doses of carboplatin AUC 2/Taxol with Herceptin and Perjeta every 3 weeks for a total of one year dosing initiated on 1/26/2018. 12 weekly doses of carboplatin/Taxol given on 4/27/2018. Herceptin/Perjeta was discontinued after 9 cycles   7/3/2018-right lumpectomy/sentinel lymph node biopsy   7/21/2018-Anastrozole ×5 years   8/13/2018 through 9/20/2018- 61 cGy adjuvant RT     Ms Arleen Garcia was seen in initial oncology consultation on 1/10/2018 referred by Dr. Tj Page for a diagnosis breast cancer.   11/15/2017-she underwent a diagnostic mammogram/US of the breast for a complaint of a lump in the right breast. He was consistent with a 2.2 x 0.9 x 1.6 cm spiculated mass at 12 o'clock position of the right breast.   12/4/2017-she underwent a core needle biopsy of the right breast lesion revealing invasive mammary carcinoma, grade 1 with associated low-grade DCIS. ER 99%, TN 66%, HER-2/adeline IHC 1+/FISH amplified, Ki-67 11%   12/15/2017-MRI bilateral breast showed a 3 x 2.1, 2.1 cm mass in the right breast. Areas of non-mass enhancement in the lower outer left breast at the 4 to 5 o'clock position. Second look ultrasound recommended. No internal mammary or axillary adenopathy identified. 12/27/2017-negative ultrasound the left breast.   1/10/2018-she was first seen in initial oncology consultation by Dr. Eula Lazo. She was recommended neoadjuvant chemotherapy with weekly paclitaxel/carboplatin, Herceptin, Perjeta regimen for 18 weeks followed by Herceptin/Perjeta to complete 1 year of treatment. 1/24/2018- nuclear bone scan documented areas of ill-defined increased activity involving both shoulder joints, sternum, thoracolumbar spine, feet, and knee joints, these most likely represent chronic degenerative process. There is less likelihood of the bony metastatic disease. 1/24/2018- CT scan of the abdomen and pelvis documented no evidence of metastatic disease. A rotated right kidney and a left renal cyst are documented. 1/24/2018. CT scan of the chest documented a 9 mm noncalcified nodule in the right upper lobe. Etiology is not certain and follow-up is recommended in 3-6 months. No evidence of mediastinal or hilar lymphadenopathy. 1/24/2018- 2-D echocardiogram documented a normal left ventricular function with an estimated ejection fraction of 55-60%. 1/26/2018 - initiation of neoadjuvant chemotherapy with weekly carboplatin AUC 2/Taxol with Herceptin and Perjeta every 3 weeks. 12 weekly doses of carboplatin/Taxol given on 4/27/2018.  Herceptin and Perjeta continue until completion of 1 year total dosing. 3/19/2018- Ultrasound of the right breast documented mild interval decrease in size of the malignant mass in the upper inner quadrant of the right breast measuring 3.1 x 1.3 x 1.7 cm and previously measured 3.7 x 1.3 x 2.3 cm.   4/26/2018- CT scan of the abdomen and pelvis without contrast obtained for flank pain, documented an oval, well circumscribed, homogeneous fluid density mass that represents a benign cyst. The right kidney is abnormally rotated and contains a single tiny non-obstructing nephrolith.   5/11/2018- CT scan of the chest with contrast documented a stable noncalcified nodule in the RUL measuring 4 mm x 3 mm. A calcified granuloma in RML lobe. Six-month follow-up is recommended. 5/30/2018-repeat 2-D echo EF 55-6%. 6/1/2018-MRI of the breast and ultrasound showed further interval response to treatment with tumor now measuring 2 x 1.6 cm (previously 2.1 x 2.5 cm). 7/3/2018-she underwent a right lumpectomy and sentinel lymph node biopsy revealing a revealed well invasive ductal carcinoma, grade 1, measuring 1.9 cm., Associated DCIS. 2 sentinel lymph nodes negative for metastatic disease. Final pathologic staging [id number])[id number]. Margins negative. Repeated ER 99%, LA 54%, HER-2/adeline IHC 1+/FISH ratio 2.0 and her-2 signals 2/per nucleus, Ki-67 10%. Therefore, the results of her HER-2 negative as per new ASCO guidelines, May 2018.   7/21/2018-she presents here today for continuation of Herceptin and Perjeta adjuvant. I will discuss with the pathology department about the findings of her HER-2/adeline status in the light of the new guidelines. If she is considered to be negative, then we will discontinue Perjeta and Herceptin and continue with adjuvant endocrine therapy only. Referral given for radiation. 8/10/2018-Herceptin, Perjeta with discontinued as per HER-2 negative according to the new HER-2/adeline guidelines.    8/13/2018 through 9/20/2018- 61 cGy adjuvant RT by Dr. Reyes Jim. 2019 Bilateral mammogram- No mammographic evidence of malignancy in the left breast. .  8 mm asymmetry in the posterior depth of the right breast, slightly remote from the surgical bed. This is probably benign as it is not confirmed on additional images. BI-RADS Final Assessment Category 3: Probably benign. 2019 Right mammogram- No suspicious findings are seen in the right breast, there are post therapeutic changes as before  2019 CT chest with contrast- Stable noncalcified nodule right upper lobe. No new pulmonary abnormalities are identified. Coronary calcifications. Postsurgical change in the right breast and right axilla. 2020 Bilateral mammogram- No mammographic or targeted ultrasonographic evidence of malignancy. Recommend annual mammography. 2020 Ultrasound left breast- No mammographic or targeted ultrasonographic evidence of malignancy  2020 CT chest with contrast- Stable right pulmonary nodules. No developing lung lesions. No acute cardiopulmonary process. 2021 Bilateral mammogram- no mammographic evidence of malignancy  2022 Bilateral mammogram- no mammographic evidence of malignancy     Age Appropriate Health Screenin2022 Cologuard- Positive:  Follow-up colonoscopy was reported as negative  2022 Bone Density [facility])- normal; lumbar spine T score -0.3; femoral neck T score 0.6    Past Medical History:    Past Medical History:   Diagnosis Date    Arthritis     Breast cancer ([address] 2018    infiltrating ductal carcinoma    Cancer ([address]     breast    H/O screening mammography 2018    BR6    History of therapeutic radiation 2018    infiltrating ductal carcinoma    Hx antineoplastic chemo 2018    infiltrating ductal carcinoma    Hyperlipidemia     Palpitations     Post-menopausal     Prolonged emergence from general anesthesia        Past Surgical History:    Past Surgical History:   Procedure Laterality Date    BREAST BIOPSY Right 2018    infiltrating ductal carcinoma    BREAST LUMPECTOMY Right 2018    infiltrating ductal carcinoma    CATHETER REMOVAL N/A 5/16/2019    PORT REMOVAL performed by Una uRiz MD at [address] [place]    JOINT REPLACEMENT Left 2017    left knee/ toe joints    OVARY REMOVAL Left 1993    MD INSJ PRPH CTR VAD W/SUBQ PORT AGE 5 YR/> N/A 1/11/2018    PORT INSERTION performed by Una Ruiz MD at [facility], PARTIAL Right 7/3/2018    LUMPECTOMY W/SNB AND INTRAOP US GUIDED NL performed by Una Ruiz MD at [facility] [id number]       Current Medications:    Current Outpatient Medications   Medication Sig Dispense Refill    anastrozole (ARIMIDEX) 1 MG tablet Take 1 tablet by mouth daily 90 tablet 3    meloxicam (MOBIC) 15 MG tablet 1 tablet      gabapentin (NEURONTIN) 100 MG capsule Take 100 mg by mouth daily. .      pravastatin (PRAVACHOL) 40 MG tablet Take 20 mg by mouth daily       metoprolol succinate (TOPROL XL) 25 MG extended release tablet Take 25 mg by mouth nightly       Omega-3 Fatty Acids (FISH OIL) 1200 MG CAPS Take 1 capsule by mouth 2 times daily       Multiple Vitamin (MULTI-VITAMIN DAILY PO) Take 1 tablet by mouth daily       calcium carbonate (OSCAL) 500 MG TABS tablet Take 500 mg by mouth daily      vitamin C (ASCORBIC ACID) 500 MG tablet Take 500 mg by mouth daily      BIOTIN FORTE PO Take 1 tablet by mouth daily        No current facility-administered medications for this visit.         Allergies: No Known Allergies    Social History:    Social History     Tobacco Use    Smoking status: Never    Smokeless tobacco: Never   Substance Use Topics    Alcohol use: No    Drug use: No       Family History:   Family History   Problem Relation Age of Onset    Breast Cancer Mother [de-identified]    Heart Disease Mother        Vitals:  Vitals:    10/12/22 1325   BP: 136/72   Pulse: 78   SpO2: 96%   Weight: 145 lb (65.8 kg)   Height: 5' 6\" (1.676 m)        Subjective   REVIEW OF SYSTEMS:   Review of Systems   Constitutional: Negative. Negative for chills, diaphoresis and fever. HENT: Negative. Negative for congestion, ear pain, hearing loss, nosebleeds, sore throat and tinnitus. Hair thinning   Eyes: Negative. Negative for pain, discharge and redness. Respiratory: Negative. Negative for cough, shortness of breath and wheezing. Cardiovascular: Negative. Negative for chest pain, palpitations and leg swelling. Gastrointestinal: Negative. Negative for abdominal pain, blood in stool, constipation, diarrhea, nausea and vomiting. Endocrine: Positive for heat intolerance (rare and tolerable). Negative for polydipsia. Genitourinary:  Negative for dysuria, flank pain, frequency, hematuria and urgency. Musculoskeletal:  Positive for arthralgias (mild and tolerable). Negative for back pain, myalgias and neck pain. Skin: Negative. Negative for rash. Neurological:  Positive for numbness. Negative for dizziness, tremors, seizures, weakness and headaches. Hematological:  Does not bruise/bleed easily. Psychiatric/Behavioral: Negative. The patient is not nervous/anxious. Objective   PHYSICAL EXAM:  Physical Exam  Vitals reviewed. Constitutional:       General: She is not in acute distress. Appearance: She is well-developed. HENT:      Head: Normocephalic and atraumatic. Mouth/Throat:      Pharynx: Uvula midline. Tonsils: No tonsillar exudate. Eyes:      General: Lids are normal.      Conjunctiva/sclera: Conjunctivae normal.      Pupils: Pupils are equal, round, and reactive to light. Neck:      Thyroid: No thyroid mass or thyromegaly. Vascular: No JVD. Trachea: Trachea normal. No tracheal deviation. Cardiovascular:      Rate and Rhythm: Normal rate and regular rhythm. Pulses: Normal pulses. Heart sounds: Normal heart sounds.    Pulmonary:      Effort: Pulmonary effort is normal. No respiratory distress. Breath sounds: Normal breath sounds. No wheezing or rales. Chest:      Chest wall: No tenderness. Abdominal:      General: Bowel sounds are normal. There is no distension. Palpations: Abdomen is soft. There is no mass. Tenderness: There is no abdominal tenderness. There is no guarding. Musculoskeletal:         General: No tenderness or deformity. Cervical back: Normal range of motion and neck supple. Comments: Range of motion within normal limits x4 extremities   Skin:     General: Skin is warm. Findings: No bruising, erythema or rash. Neurological:      Mental Status: She is alert and oriented to person, place, and time. Cranial Nerves: No cranial nerve deficit. Coordination: Coordination normal.   Psychiatric:         Behavior: Behavior normal.         Thought Content: Thought content normal.       Labs reviewed today:  Lab Results   Component Value Date    WBC 6.39 10/12/2022    HGB 13.8 10/12/2022    HCT 45.2 (H) 10/12/2022    MCV 98.9 (H) 10/12/2022     (L) 10/12/2022         ASSESSMENT/PLAN:      1. Invasive mammary carcinoma of the right breast, ER/MN positive, HER2/adeline negative, diagnosed 2017. Current recommendation is for adjuvant endocrine therapy with Arimidex 1 mg p.o. daily for 5 years, through July 2023. Reports compliant with Arimidex. Has no new breast complaints and declined breast examination today. Sisi Robles was seen in scheduled follow-up by Dr Zuleima Beatty on 08/03/2022, I reviewed the progress note from that office visit which documented bilateral breast exam with no new findings or concerns of recurrence. The note states that she has appropriate postoperative scaring on the right breast.      Discussed with the patient the benefit of completing breast cancer index testing  is to see if any benefit would be obtained from an additional 5 years of adjuvant endocrine therapy.   The study will provide a prognostic result that predicts your risk of late distant recurrence between years 5-10, and will clearly state yes or no if you would receive additional benefit for additional 5 years of endocrine therapy. Once the breast cancer index study results are available, we will discuss the results and if appropriate will discuss if you would like to continue with an additional 5 years of adjuvant endocrine therapy to decrease your risk for late recurrence.    -Continue Arimidex 1 mg daily  -Keep scheduled follow-up appointment with Dr. Sara Delgado Index    2. Encounter for monitoring aromatase inhibitor therapy, Arimidex  Hot flashes-reports rarely occur and are tolerable  Arthralgias, more intense in her elbow and hands are currently tolerable  Hair thinning    3. Incidental lung nodule suspected to be benign, initially measuring 9 mm in the RUL on 1/24/2018, measured 6 mm on 5/20/2019 and measured 7 mm on 6/3/2020. The nodule is considered to be benign with no growth over 29 months. No further work-up warranted unless she becomes symptomatic. Denies any respiratory complaints    4. 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 Chronic neuropathy to her feet is stable with no change    5. At risk for osteopenia.  5/02/2022 Bone Density [facility])- normal; lumbar spine T score -0.3; femoral neck T score 0.6. Reports currently not taking calcium supplement    -Encouraged to take calcium 1200 mg with vitamin D 1000 units daily  -Anticipating annual lab work to be completed by Dr. Tamir Pitts in December  I discussed all of the above findings included in the assessment and plan with the patient and the patient is in agreement to move forward with current recommendations/treatment. I have addressed all of their questions and concerns that were verbalized. FOLLOW UP:  1. Follow up given for 6 months or sooner, if needed  2. Continue to follow with other medical providers as recommended  3. Labs at next visit: CBC (CMP and vitamin D level if not previously completed by another provider)    EMR Dragon/Transcription disclaimer:   Much of this encounter note is an electronic transcription/translation of spoken language to printed text. The electronic translation of spoken language may permit erroneous, or at times, nonsensical words or phrases to be inadvertently transcribed; although attempts have made to review the note for such errors, some may still exist.  Please excuse any unrecognized transcription errors and contact us if the error is unintelligible or needs documented correction. Also, portions of this note have been copied forward, however, changed to reflect the most current clinical status of this patient. Electronically signed by TOMA Gonzalez on 10/12/2022 at 5:54 PM  Boyd APARICIO am pre-charting as a registered nurse for TOMA Wagner.

## 2023-02-08 ENCOUNTER — HOSPITAL ENCOUNTER (OUTPATIENT)
Dept: WOMENS IMAGING | Age: 78
Discharge: HOME | End: 2023-02-08
Payer: MEDICARE

## 2023-02-08 VITALS — BODY MASS INDEX: 25.02 KG/M2 | WEIGHT: 155 LBS

## 2023-02-08 DIAGNOSIS — Z85.3 PERSONAL HISTORY OF BREAST CANCER: ICD-10-CM

## 2023-02-08 PROCEDURE — G0279 TOMOSYNTHESIS, MAMMO: HCPCS

## 2023-04-24 ENCOUNTER — OFFICE VISIT (OUTPATIENT)
Dept: SURGERY | Age: 78
End: 2023-04-24
Payer: MEDICARE

## 2023-04-24 VITALS — DIASTOLIC BLOOD PRESSURE: 84 MMHG | HEART RATE: 80 BPM | SYSTOLIC BLOOD PRESSURE: 118 MMHG

## 2023-04-24 DIAGNOSIS — Z98.890 S/P LUMPECTOMY, RIGHT BREAST: Primary | ICD-10-CM

## 2023-04-24 DIAGNOSIS — C50.211 MALIGNANT NEOPLASM OF UPPER-INNER QUADRANT OF RIGHT BREAST IN FEMALE, ESTROGEN RECEPTOR POSITIVE (HCC): ICD-10-CM

## 2023-04-24 DIAGNOSIS — Z17.0 MALIGNANT NEOPLASM OF UPPER-INNER QUADRANT OF RIGHT BREAST IN FEMALE, ESTROGEN RECEPTOR POSITIVE (HCC): ICD-10-CM

## 2023-04-24 PROCEDURE — 1036F TOBACCO NON-USER: CPT | Performed by: SURGERY

## 2023-04-24 PROCEDURE — G8427 DOCREV CUR MEDS BY ELIG CLIN: HCPCS | Performed by: SURGERY

## 2023-04-24 PROCEDURE — G8417 CALC BMI ABV UP PARAM F/U: HCPCS | Performed by: SURGERY

## 2023-04-24 PROCEDURE — 1123F ACP DISCUSS/DSCN MKR DOCD: CPT | Performed by: SURGERY

## 2023-04-24 PROCEDURE — 1090F PRES/ABSN URINE INCON ASSESS: CPT | Performed by: SURGERY

## 2023-04-24 PROCEDURE — 99213 OFFICE O/P EST LOW 20 MIN: CPT | Performed by: SURGERY

## 2023-04-24 PROCEDURE — G8400 PT W/DXA NO RESULTS DOC: HCPCS | Performed by: SURGERY

## 2023-05-19 ENCOUNTER — TELEPHONE (OUTPATIENT)
Dept: NEUROLOGY | Facility: CLINIC | Age: 78
End: 2023-05-19
Payer: MEDICARE

## 2023-05-19 NOTE — TELEPHONE ENCOUNTER
Gave patient a courtesy call about their appointment with our office tomorrow. Patient voiced they were aware.

## 2023-05-22 ENCOUNTER — OFFICE VISIT (OUTPATIENT)
Dept: NEUROLOGY | Facility: CLINIC | Age: 78
End: 2023-05-22
Payer: MEDICARE

## 2023-05-22 VITALS
OXYGEN SATURATION: 94 % | DIASTOLIC BLOOD PRESSURE: 80 MMHG | BODY MASS INDEX: 25.55 KG/M2 | SYSTOLIC BLOOD PRESSURE: 142 MMHG | WEIGHT: 159 LBS | HEART RATE: 76 BPM | HEIGHT: 66 IN

## 2023-05-22 DIAGNOSIS — R47.89 WORD FINDING DIFFICULTY: ICD-10-CM

## 2023-05-22 DIAGNOSIS — R41.3 MEMORY LOSS: Primary | ICD-10-CM

## 2023-05-22 PROBLEM — M17.9 OSTEOARTHRITIS OF KNEE: Status: ACTIVE | Noted: 2023-05-22

## 2023-05-22 PROBLEM — R91.1 INCIDENTAL LUNG NODULE, > 3MM AND < 8MM: Status: ACTIVE | Noted: 2019-12-25

## 2023-05-22 PROBLEM — C50.211 MALIGNANT NEOPLASM OF UPPER-INNER QUADRANT OF RIGHT BREAST IN FEMALE, ESTROGEN RECEPTOR POSITIVE: Status: ACTIVE | Noted: 2017-12-08

## 2023-05-22 PROBLEM — G62.0 PERIPHERAL NEUROPATHY DUE TO CHEMOTHERAPY: Status: ACTIVE | Noted: 2019-12-25

## 2023-05-22 PROBLEM — I10 HYPERTENSIVE DISORDER: Status: ACTIVE | Noted: 2017-01-19

## 2023-05-22 PROBLEM — Z17.0 MALIGNANT NEOPLASM OF UPPER-INNER QUADRANT OF RIGHT BREAST IN FEMALE, ESTROGEN RECEPTOR POSITIVE: Status: ACTIVE | Noted: 2017-12-08

## 2023-05-22 PROBLEM — Z91.89 AT RISK FOR OSTEOPENIA: Status: ACTIVE | Noted: 2020-05-22

## 2023-05-22 PROBLEM — T45.1X5A PERIPHERAL NEUROPATHY DUE TO CHEMOTHERAPY: Status: ACTIVE | Noted: 2019-12-25

## 2023-05-22 PROBLEM — Z98.890 S/P LUMPECTOMY, RIGHT BREAST: Status: ACTIVE | Noted: 2018-07-11

## 2023-05-22 PROBLEM — Z51.81 ENCOUNTER FOR MONITORING AROMATASE INHIBITOR THERAPY: Status: ACTIVE | Noted: 2020-05-22

## 2023-05-22 PROBLEM — Z79.811 ENCOUNTER FOR MONITORING AROMATASE INHIBITOR THERAPY: Status: ACTIVE | Noted: 2020-05-22

## 2023-05-22 PROCEDURE — 3079F DIAST BP 80-89 MM HG: CPT | Performed by: NURSE PRACTITIONER

## 2023-05-22 PROCEDURE — 1160F RVW MEDS BY RX/DR IN RCRD: CPT | Performed by: NURSE PRACTITIONER

## 2023-05-22 PROCEDURE — 1159F MED LIST DOCD IN RCRD: CPT | Performed by: NURSE PRACTITIONER

## 2023-05-22 PROCEDURE — 3077F SYST BP >= 140 MM HG: CPT | Performed by: NURSE PRACTITIONER

## 2023-05-22 PROCEDURE — 99204 OFFICE O/P NEW MOD 45 MIN: CPT | Performed by: NURSE PRACTITIONER

## 2023-05-22 RX ORDER — CALCIUM CARBONATE 500(1250)
600 TABLET ORAL DAILY
COMMUNITY

## 2023-05-22 RX ORDER — AMOXICILLIN 500 MG
1200 CAPSULE ORAL
COMMUNITY

## 2023-05-22 RX ORDER — MELOXICAM 15 MG/1
15 TABLET ORAL DAILY
COMMUNITY

## 2023-05-22 RX ORDER — PRAVASTATIN SODIUM 40 MG
40 TABLET ORAL DAILY
COMMUNITY

## 2023-05-22 RX ORDER — METOPROLOL SUCCINATE 25 MG/1
25 TABLET, EXTENDED RELEASE ORAL DAILY
COMMUNITY

## 2023-05-22 RX ORDER — ANASTROZOLE 1 MG/1
1 TABLET ORAL DAILY
COMMUNITY

## 2023-05-22 RX ORDER — GABAPENTIN 100 MG/1
100 CAPSULE ORAL DAILY
COMMUNITY

## 2023-05-22 NOTE — PROGRESS NOTES
"    Neurology Consult Note    Referring Provider:   Ki Johnson MD     Reason for Consultation:    Aphasia    Subjective   History of Present Illness:  Marcelina Raymundo is a 77 y.o. female who presents today for aphasia.  She is routinely followed by Ki Johnson MD for primary care.     She reports that the aphasia started about 6 months ago without any known cause.  There have been no leading events or injuries prior to this.  She really reports symptoms that are concerning more so for word finding rather than true aphasia.  She notes that she will have to search for words.  The meantime she is able to come up with a word after some time to while other times she is unable to come up with the word.  She notes that she is having some memory problems consisting of forgetfulness, mixing up dates and times, and sometimes forgetting to do tasks.  She does note that she has trouble paying attention to certain things and has some inattentiveness.  She states some brain fog and notes that she feels as if \"things are just happening and I am observing them\".  She notes that she has some times where she is off balance but has not had any falls or ataxic gait.  She notes some headaches that appear sinus.  She also has a history of breast cancer with a few months of chemotherapy around 5 years ago.  She denies any symptoms of anxiety or depression.  She notes some generalized weakness but attributes this to not much physical activity recently.  She has had an MRI with and without contrast which resulted with atrophy, chronic white matter changes, and some areas of hemosiderin deposits which may be associated to CAA.  I do not have images but do have the report.     Allergies:    Patient has no known allergies.    Medications:  Current Outpatient Medications   Medication Sig Dispense Refill    anastrozole (ARIMIDEX) 1 MG tablet Take 1 tablet by mouth Daily.      Biotin 5 MG tablet dispersible 5,000 mcg Daily.      " "Calcium 500 MG tablet Take 600 mg by mouth Daily.      gabapentin (NEURONTIN) 100 MG capsule Take 1 capsule by mouth Daily.      meloxicam (MOBIC) 15 MG tablet Take 1 tablet by mouth Daily.      metoprolol succinate XL (TOPROL-XL) 25 MG 24 hr tablet Take 1 tablet by mouth Daily.      Omega-3 Fatty Acids (fish oil) 1200 MG capsule capsule Take 1 capsule by mouth Daily With Breakfast.      pravastatin (PRAVACHOL) 40 MG tablet Take 1 tablet by mouth Daily.       No current facility-administered medications for this visit.     Current outpatient and discharge medications have been reconciled for the patient.  Reviewed by: HERBER Dobson    Past Medical History:  History reviewed. No pertinent past medical history.  History reviewed. No pertinent surgical history.  History reviewed. No pertinent family history.  Social History     Tobacco Use    Smoking status: Never    Smokeless tobacco: Never       Review of Systems   Constitutional:  Positive for fatigue.   Musculoskeletal:  Positive for gait problem (some imbalance).   Neurological:  Positive for speech difficulty, weakness, headache and memory problem. Negative for facial asymmetry.   Psychiatric/Behavioral:  Negative for stress.        Objective   Vital Signs:  Heart Rate:  [76] 76  BP: (142)/(80) 142/80      05/22/23  1014   Weight: 72.1 kg (159 lb)     167.6 cm (66\")  Body mass index is 25.66 kg/m².    Physical Exam  Vitals reviewed.   Constitutional:       Appearance: Normal appearance.   HENT:      Head: Normocephalic.      Mouth/Throat:      Pharynx: Oropharynx is clear.   Eyes:      General: Lids are normal.      Extraocular Movements: Extraocular movements intact.      Pupils: Pupils are equal, round, and reactive to light.   Cardiovascular:      Rate and Rhythm: Normal rate and regular rhythm.      Pulses: Normal pulses.   Pulmonary:      Effort: Pulmonary effort is normal.   Musculoskeletal:         General: Normal range of motion.      Cervical " back: Normal range of motion and neck supple.   Skin:     General: Skin is warm and dry.      Capillary Refill: Capillary refill takes less than 2 seconds.   Neurological:      Coordination: Coordination is intact.      Deep Tendon Reflexes: Reflexes are normal and symmetric.   Psychiatric:         Mood and Affect: Mood normal.         Speech: Speech normal.     Neurological Exam  Mental Status  Awake, alert and oriented to person, place and time. Speech is normal. Able to name objects, name parts of objects, repeat, read and write. Follows complex commands. Language: Some word finding noted.  All objects presented were identified appropriately.    Cranial Nerves  CN II: Visual acuity is normal. Visual fields full to confrontation.  CN III, IV, VI: Extraocular movements intact bilaterally. Normal lids and orbits bilaterally. Pupils equal round and reactive to light bilaterally.  CN V: Facial sensation is normal.  CN VII: Full and symmetric facial movement.  CN IX, X: Palate elevates symmetrically. Normal gag reflex.  CN XI: Shoulder shrug strength is normal.  CN XII: Tongue midline without atrophy or fasciculations.    Motor  Normal muscle bulk throughout. No fasciculations present. Normal muscle tone. No abnormal involuntary movements.  Noted some mild generalized weakness at a 4+ in all four extremities.    Sensory  Sensation is intact to light touch, pinprick, vibration and proprioception in all four extremities.    Reflexes  Deep tendon reflexes are 2+ and symmetric in all four extremities.    Coordination    Finger-to-nose, rapid alternating movements and heel-to-shin normal bilaterally without dysmetria.    Gait  Normal casual, toe, heel and tandem gait.    Results Review:    Lab Results   Component Value Date    GLUCOSE 129 (H) 07/02/2018    BUN 19 07/02/2018    CREATININE 0.7 07/02/2018    EGFRIFNONA >60 07/02/2018    K 3.9 07/02/2018    CO2 28 07/02/2018    CALCIUM 9.5 07/02/2018     Lab Results   Component  Value Date    WBC 6.39 10/12/2022    HGB 13.8 10/12/2022    HCT 45.2 (H) 10/12/2022    MCV 98.9 (H) 10/12/2022     (L) 10/12/2022     No results found for: CHOL, CHLPL, TRIG, HDL, LDL, LDLDIRECT  No results found for: TSH  No results found for: HGBA1C  No results found for: FOLATE  No results found for: [id number]    RADIOLOGY - SCAN - [facility] ORDERED-MRI BRAIN (02/13/2023)   Chart Review:  PROGRESS NOTES - SCAN - [facility] MED-1.20.23 (01/20/2023)      Plan .  Impression:  Marcelina Raymundo is a 77 y.o. female who presents for word finding and concern for aphasia.  She notes that this has been ongoing for around 6 months.  She also has concern for memory loss as well.  She has some generalized weakness secondary to deconditioning but is able to ambulate appropriately without falls. She is able to perform all ADL's appropriately at home without concern. I am not really sure that she is having aphasia as much as word finding from memory loss at this point as she did very well with examination.  She does have concerns for memory and I feel she may have some mild cognitive impairment secondary to the findings on MRI (hemosiderin deposits, atrophy, and small vessel disease) and her chemo in the past. I would like to get the images for this to review and we have requested them.  I would like to further work-up her concerns with an EEG, blood work, and ST evaluation and treatment for word finding.  I have discussed this with them in detail and they are understanding and agreeable.  We will send the orders to [facility] for evaluation.      Plan:  No need for new MRI.  Obtain images from [facility]  EEG  CMP, CBC, B12, Folate, TSH, T4, Heavy metals.  ST evaluation  Call with concerns.    The patient and I have discussed the plan of care and she is in full agreement at this time.     Follow-Up:  Return in about 3 months (around 8/22/2023) for Memory.         Tutu Espino, HERBER  05/22/23  12:39 CDT

## 2023-05-25 DIAGNOSIS — R41.3 MEMORY LOSS: ICD-10-CM

## 2023-05-25 DIAGNOSIS — R47.89 WORD FINDING DIFFICULTY: ICD-10-CM

## 2023-05-26 ENCOUNTER — TELEPHONE (OUTPATIENT)
Dept: NEUROLOGY | Facility: CLINIC | Age: 78
End: 2023-05-26
Payer: MEDICARE

## 2023-05-26 NOTE — TELEPHONE ENCOUNTER
----- Message from HERBER Dobson sent at 5/25/2023  2:21 PM CDT -----  The labs look good without any abnormalities.

## 2023-06-01 ENCOUNTER — TELEPHONE (OUTPATIENT)
Dept: NEUROLOGY | Facility: CLINIC | Age: 78
End: 2023-06-01

## 2023-06-01 NOTE — TELEPHONE ENCOUNTER
----- Message from HERBER Dobson sent at 5/30/2023  8:50 AM CDT -----  No concerns for heavy metals labs.

## 2023-06-08 DIAGNOSIS — R41.3 MEMORY LOSS: ICD-10-CM

## 2023-06-12 ENCOUNTER — TELEPHONE (OUTPATIENT)
Dept: NEUROLOGY | Facility: CLINIC | Age: 78
End: 2023-06-12
Payer: MEDICARE

## 2023-06-12 NOTE — TELEPHONE ENCOUNTER
----- Message from HERBER Dobson sent at 6/8/2023  2:19 PM CDT -----  EEG normal.  Please let her know.

## 2023-09-12 ENCOUNTER — TELEPHONE (OUTPATIENT)
Dept: NEUROLOGY | Facility: CLINIC | Age: 78
End: 2023-09-12
Payer: MEDICARE

## 2023-09-12 NOTE — TELEPHONE ENCOUNTER
CALLED PATIENT TO LET THEM KNOW THAT VANDANA IS GOING TO HAVE TO WORK IN THE HOSPITAL AT THE END OF THIS WEEK AND I WOULD NEED TO RESCHEDULE THEIR APPOINTMENT. PATIENT STATED THAT WAS OKAY. MOVED APPOINTMENT TO OCTOBER 26TH AT 1PM. PATIENT IS AWARE OF DATE AND TIME

## 2023-10-23 NOTE — PROGRESS NOTES
HISTORY OF PRESENT ILLNESS:  Ms. Anette Cantor  is a 66 y.o.   female who presents today for breast exam following right lumpectomy with sentinel node biopsy on 7/3/2018    She is status post right lumpectomy and sentinel node biopsy on 7/3/2018. This was following neoadjuvant chemotherapy for a 3.2  cm low grade invasive ductal carcinoma. ER/IL strongly positive. IL was strongly positive. HER-2 was negative by IHC. Ki-67 was 11% and borderline. Delayed results demonstrated HER-2 as positive by FISH. Unfortunately the HER-2 positive by FISH was not available at that time and after discussion with Dr. Adam Velazquez, we saw her back for consideration of neoadjuvant chemotherapy instead. Repeat HER-2 on her surgical specimen was indeterminate. PATHOLOGY REVEALS:  FINAL DIAGNOSIS:  A. Right breast, lumpectomy with sentinel lymph node biopsy and       ultrasound-guided wire localization:       Invasive ductal carcinoma, grade 1. Greatest dimension of the residual invasive carcinoma is 1.9 cm. Invasive carcinoma approaches to the closest anterior caudal margin       at 1 mm. Ductal carcinoma in situ is present, cribriform and solid patterns,       nuclear grade 1. The ductal carcinoma in situ approaches to the closest inferior       caudal margin at 10 mm.     B. Right breast, excision of additional caudal and medial margins:       Benign breast tissue. C. Right breast, excision of additional deep margins:       Benign skeletal muscle and adipose tissue. D. Lymph node, right axilla, sentinel lymph node biopsy: Benign lymph       node with fatty infiltration and partial hyalinization. She completed her radiation therapy. She is really doing very well. She is heading to [place] with her sisters which she says is always very special for her. She does not really like the anastrozole. She says it makes her tired.   I recommended to do her best to start some aerobic

## 2023-10-25 ENCOUNTER — OFFICE VISIT (OUTPATIENT)
Dept: SURGERY | Age: 78
End: 2023-10-25

## 2023-10-25 VITALS — DIASTOLIC BLOOD PRESSURE: 80 MMHG | HEART RATE: 80 BPM | TEMPERATURE: 97 F | SYSTOLIC BLOOD PRESSURE: 124 MMHG

## 2023-10-25 DIAGNOSIS — C50.211 MALIGNANT NEOPLASM OF UPPER-INNER QUADRANT OF RIGHT BREAST IN FEMALE, ESTROGEN RECEPTOR POSITIVE (HCC): ICD-10-CM

## 2023-10-25 DIAGNOSIS — Z17.0 MALIGNANT NEOPLASM OF UPPER-INNER QUADRANT OF RIGHT BREAST IN FEMALE, ESTROGEN RECEPTOR POSITIVE (HCC): ICD-10-CM

## 2023-10-25 DIAGNOSIS — Z98.890 S/P LUMPECTOMY, RIGHT BREAST: ICD-10-CM

## 2023-10-25 DIAGNOSIS — Z85.3 PERSONAL HISTORY OF BREAST CANCER: Primary | ICD-10-CM

## 2023-10-26 ENCOUNTER — OFFICE VISIT (OUTPATIENT)
Dept: NEUROLOGY | Facility: CLINIC | Age: 78
End: 2023-10-26
Payer: MEDICARE

## 2023-10-26 VITALS
DIASTOLIC BLOOD PRESSURE: 70 MMHG | HEIGHT: 66 IN | HEART RATE: 79 BPM | WEIGHT: 156 LBS | SYSTOLIC BLOOD PRESSURE: 128 MMHG | BODY MASS INDEX: 25.07 KG/M2 | OXYGEN SATURATION: 96 %

## 2023-10-26 DIAGNOSIS — R47.89 WORD FINDING DIFFICULTY: ICD-10-CM

## 2023-10-26 DIAGNOSIS — G31.84 MILD COGNITIVE IMPAIRMENT: Primary | ICD-10-CM

## 2023-10-26 PROCEDURE — 3074F SYST BP LT 130 MM HG: CPT | Performed by: NURSE PRACTITIONER

## 2023-10-26 PROCEDURE — 3078F DIAST BP <80 MM HG: CPT | Performed by: NURSE PRACTITIONER

## 2023-10-26 PROCEDURE — 1159F MED LIST DOCD IN RCRD: CPT | Performed by: NURSE PRACTITIONER

## 2023-10-26 PROCEDURE — 99214 OFFICE O/P EST MOD 30 MIN: CPT | Performed by: NURSE PRACTITIONER

## 2023-10-26 PROCEDURE — 1160F RVW MEDS BY RX/DR IN RCRD: CPT | Performed by: NURSE PRACTITIONER

## 2023-10-26 NOTE — PROGRESS NOTES
Neurology Progress Note      Reason for Consultation:    Memory Loss    Subjective   History of Present Illness:  Marcelina Raymundo is a 78 y.o. female who presents today for memory loss.  She is routinely followed by Ki Johnson MD for primary care. She is present with her  today.    They note she remains doing well.  She continues to have short term memory concerns which are focused primarily around dates, times, and new information.  She also has some word-finding as well.  She continues to do well with her daily activities at home.     She had EEG which was negative.  Her Lab work was unremarkable.  I was able to review images of her MRI which do show atrophy, small vessel disease, and areas of susceptibility artifact consistent with CAA.  She has undergone some ST and they note no changes since this.  They did note mild cognitive impairment from memory evaluation.     Allergies:    Patient has no known allergies.    Medications:  Current Outpatient Medications   Medication Sig Dispense Refill    Biotin 5 MG tablet dispersible 5,000 mcg Daily.      Calcium 500 MG tablet Take 600 mg by mouth Daily.      gabapentin (NEURONTIN) 100 MG capsule Take 1 capsule by mouth Daily.      meloxicam (MOBIC) 15 MG tablet Take 1 tablet by mouth Daily.      metoprolol succinate XL (TOPROL-XL) 25 MG 24 hr tablet Take 1 tablet by mouth Daily.      Omega-3 Fatty Acids (fish oil) 1200 MG capsule capsule Take 1 capsule by mouth Daily With Breakfast.      pravastatin (PRAVACHOL) 40 MG tablet Take 1 tablet by mouth Daily.       No current facility-administered medications for this visit.     Current outpatient and discharge medications have been reconciled for the patient.  Reviewed by: HERBER Dobson    Past Medical History:  History reviewed. No pertinent past medical history.  History reviewed. No pertinent surgical history.  History reviewed. No pertinent family history.  Social History     Tobacco Use     "Smoking status: Never    Smokeless tobacco: Never       Review of Systems   Constitutional:  Positive for fatigue.   Musculoskeletal:  Positive for gait problem (some imbalance).   Neurological:  Positive for speech difficulty, weakness, headache and memory problem. Negative for facial asymmetry.   Psychiatric/Behavioral:  Negative for stress.          Objective   Vital Signs:  Heart Rate:  [79] 79  BP: (128)/(70) 128/70      10/26/23  1300   Weight: 70.8 kg (156 lb)     167.6 cm (66\")  Body mass index is 25.18 kg/m².    Physical Exam  Vitals reviewed.   Constitutional:       Appearance: Normal appearance.   HENT:      Head: Normocephalic.      Mouth/Throat:      Pharynx: Oropharynx is clear.   Eyes:      General: Lids are normal.      Extraocular Movements: Extraocular movements intact.      Pupils: Pupils are equal, round, and reactive to light.   Cardiovascular:      Rate and Rhythm: Normal rate and regular rhythm.      Pulses: Normal pulses.   Pulmonary:      Effort: Pulmonary effort is normal.   Musculoskeletal:         General: Normal range of motion.      Cervical back: Normal range of motion and neck supple.   Skin:     General: Skin is warm and dry.      Capillary Refill: Capillary refill takes less than 2 seconds.   Neurological:      Coordination: Coordination is intact.      Deep Tendon Reflexes: Reflexes are normal and symmetric.   Psychiatric:         Mood and Affect: Mood normal.         Speech: Speech normal.       Neurological Exam  Mental Status  Awake, alert and oriented to person, place and time. Speech is normal. Able to name objects, name parts of objects, repeat, read and write. Follows complex commands. Language: Some word finding noted.  All objects presented were identified appropriately.    Cranial Nerves  CN II: Visual acuity is normal. Visual fields full to confrontation.  CN III, IV, VI: Extraocular movements intact bilaterally. Normal lids and orbits bilaterally. Pupils equal round and " "reactive to light bilaterally.  CN V: Facial sensation is normal.  CN VII: Full and symmetric facial movement.  CN IX, X: Palate elevates symmetrically. Normal gag reflex.  CN XI: Shoulder shrug strength is normal.  CN XII: Tongue midline without atrophy or fasciculations.    Motor  Normal muscle bulk throughout. No fasciculations present. Normal muscle tone. No abnormal involuntary movements.  Noted some mild generalized weakness at a 4+ in all four extremities.    Sensory  Sensation is intact to light touch, pinprick, vibration and proprioception in all four extremities.    Reflexes  Deep tendon reflexes are 2+ and symmetric in all four extremities.    Coordination    Finger-to-nose, rapid alternating movements and heel-to-shin normal bilaterally without dysmetria.    Gait  Normal casual, toe, heel and tandem gait.      Results Review:    Lab Results   Component Value Date    GLUCOSE 129 (H) 07/02/2018    BUN 19 07/02/2018    CREATININE 0.7 07/02/2018    EGFRIFNONA >60 07/02/2018    K 3.9 07/02/2018    CO2 28 07/02/2018    CALCIUM 9.5 07/02/2018     Lab Results   Component Value Date    WBC 6.39 10/12/2022    HGB 13.8 10/12/2022    HCT 45.2 (H) 10/12/2022    MCV 98.9 (H) 10/12/2022     (L) 10/12/2022     No results found for: \"CHOL\", \"CHLPL\", \"TRIG\", \"HDL\", \"LDL\", \"LDLDIRECT\"  No results found for: \"TSH\"  No results found for: \"HGBA1C\"  No results found for: \"FOLATE\"  No results found for: \"KQBTQULH71\"    RADIOLOGY - SCAN - [facility] ORDERED-MRI BRAIN (02/13/2023)     EEG - SCAN - EEG (06/06/2023)  LABORATORY - SCAN - HEAVY METALS, BLOOD (05/24/2023)  LABORATORY - SCAN - COMPREHENSIVE METABOLIC PANEL (05/24/2023)  LABORATORY - SCAN - VITAMIN B12 (05/24/2023)    Chart Review:  PROGRESS NOTES - SCAN - [facility] NOTE-[facility] MED-1.20.23 (01/20/2023)     EXTERNAL MEDICAL RECORDS - SCAN - [facility] (07/25/2023)  EXTERNAL MEDICAL RECORDS - SCAN - [facility] " (07/06/2023)  EXTERNAL MEDICAL RECORDS - SCAN - [place] [facility] (07/06/2023)  EXTERNAL MEDICAL RECORDS - SCAN - [place] [facility] (06/07/2023)       Plan .  Impression:  Marcelina Raymundo is a 78 y.o. female who presents for memory loss.  She continues with short term memory concerns and does continue with some word finding.  They continue to report this is mild and for minor things.  She continues to have issues with short term memory issues primarily.  This is unchanged.  Testing without any reversible causes.  At this point her symptoms are consistent with mild cognitive impairment.  There are no therapy needs.  Most likely cause to her memory loss would be CAA at this point.  This was explained to the patient and her .    Plan:  Supportive care  Fall risk/prevention discussed  No identified therapy needs  Call with concerns.    The patient and I have discussed the plan of care and she is in full agreement at this time.     Follow-Up:  Return in about 6 months (around 4/26/2024) for Memory.         Tutu Espino, HERBER  10/26/23  13:36 CDT

## 2023-10-26 NOTE — Clinical Note
They would like the records from EEG and labs that I ordered to be sent to Dr. Johnson please.  Thanks

## 2024-02-12 ENCOUNTER — OFFICE VISIT (OUTPATIENT)
Dept: SURGERY | Age: 79
End: 2024-02-12

## 2024-02-12 ENCOUNTER — HOSPITAL ENCOUNTER (OUTPATIENT)
Dept: WOMENS IMAGING | Age: 79
Discharge: HOME | End: 2024-02-12
Payer: MEDICARE

## 2024-02-12 VITALS
BODY MASS INDEX: 24.48 KG/M2 | WEIGHT: 156 LBS | SYSTOLIC BLOOD PRESSURE: 130 MMHG | DIASTOLIC BLOOD PRESSURE: 74 MMHG | HEIGHT: 67 IN

## 2024-02-12 DIAGNOSIS — Z85.3 PERSONAL HISTORY OF BREAST CANCER: Primary | ICD-10-CM

## 2024-02-12 DIAGNOSIS — Z85.3 PERSONAL HISTORY OF BREAST CANCER: ICD-10-CM

## 2024-02-12 DIAGNOSIS — Z98.890 S/P LUMPECTOMY, RIGHT BREAST: ICD-10-CM

## 2024-02-12 PROCEDURE — 77063 BREAST TOMOSYNTHESIS BI: CPT

## 2024-10-30 ENCOUNTER — OFFICE VISIT (OUTPATIENT)
Dept: NEUROLOGY | Age: 79
End: 2024-10-30
Payer: MEDICARE

## 2024-10-30 VITALS
DIASTOLIC BLOOD PRESSURE: 100 MMHG | BODY MASS INDEX: 24.48 KG/M2 | HEIGHT: 67 IN | RESPIRATION RATE: 16 BRPM | HEART RATE: 73 BPM | WEIGHT: 156 LBS | OXYGEN SATURATION: 98 % | SYSTOLIC BLOOD PRESSURE: 160 MMHG

## 2024-10-30 DIAGNOSIS — Z86.79 HISTORY OF HYPERTENSION: ICD-10-CM

## 2024-10-30 DIAGNOSIS — R41.3 MEMORY LOSS: ICD-10-CM

## 2024-10-30 DIAGNOSIS — R41.3 MEMORY LOSS: Primary | ICD-10-CM

## 2024-10-30 DIAGNOSIS — Z86.39 HISTORY OF HYPERLIPIDEMIA: ICD-10-CM

## 2024-10-30 LAB — VIT B12 SERPL-MCNC: 739 PG/ML (ref 232–1245)

## 2024-10-30 PROCEDURE — G8427 DOCREV CUR MEDS BY ELIG CLIN: HCPCS | Performed by: PSYCHIATRY & NEUROLOGY

## 2024-10-30 PROCEDURE — 1036F TOBACCO NON-USER: CPT | Performed by: PSYCHIATRY & NEUROLOGY

## 2024-10-30 PROCEDURE — 1123F ACP DISCUSS/DSCN MKR DOCD: CPT | Performed by: PSYCHIATRY & NEUROLOGY

## 2024-10-30 PROCEDURE — 1159F MED LIST DOCD IN RCRD: CPT | Performed by: PSYCHIATRY & NEUROLOGY

## 2024-10-30 PROCEDURE — G8420 CALC BMI NORM PARAMETERS: HCPCS | Performed by: PSYCHIATRY & NEUROLOGY

## 2024-10-30 PROCEDURE — 99204 OFFICE O/P NEW MOD 45 MIN: CPT | Performed by: PSYCHIATRY & NEUROLOGY

## 2024-10-30 PROCEDURE — G8484 FLU IMMUNIZE NO ADMIN: HCPCS | Performed by: PSYCHIATRY & NEUROLOGY

## 2024-10-30 PROCEDURE — 1090F PRES/ABSN URINE INCON ASSESS: CPT | Performed by: PSYCHIATRY & NEUROLOGY

## 2024-10-30 PROCEDURE — G8400 PT W/DXA NO RESULTS DOC: HCPCS | Performed by: PSYCHIATRY & NEUROLOGY

## 2024-10-30 NOTE — PROGRESS NOTES
Chief Complaint   Patient presents with    New Patient     Patient states she is having memory issues and headaches       Britney Altman is a 79 y.o. year old female who is seen for evaluation of her poor short-term memory.  Symptoms have been going on for about 6 months..  She recently had blood work showing a beta amyloid 42/40 ratio of 0.101 when the reference range for normal is greater than 0.102, therefore a minor variance.  This might suggest an increased risk for Alzheimer's related pathology.  Thus this consultation.  There is no family history of dementia.  Records are reviewed.  Patient does have a history of breast cancer.    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    At risk for osteopenia 05/22/2020    Encounter for monitoring aromatase inhibitor therapy 05/22/2020     Resolved Ambulatory Problems     Diagnosis Date Noted    No Resolved Ambulatory Problems     Past Medical History:   Diagnosis Date    Arthritis     Breast cancer (HCC) 2018    Cancer (HCC)     H/O screening mammography 06/01/2018    History of therapeutic radiation 2018    Hx antineoplastic chemo 2018    Hyperlipidemia     Palpitations     Post-menopausal     Prolonged emergence from general anesthesia        Past Surgical History:   Procedure Laterality Date    BREAST BIOPSY Right 2018    infiltrating ductal carcinoma    BREAST LUMPECTOMY Right 2018    infiltrating ductal carcinoma    CATHETER REMOVAL N/A 5/16/2019    PORT REMOVAL performed by Sidney Arizmendi MD at [facility] OR    COLONOSCOPY  2018    @[facility]    JOINT REPLACEMENT Left 2017    left knee/ toe joints    OVARY REMOVAL Left 1993    [facility] VAD W/SUBQ PORT AGE 5 YR/> N/A

## 2024-10-30 NOTE — PROGRESS NOTES
REVIEW OF SYSTEMS    Constitutional: []Fever []Sweat []Chills [] Recent Injury [x] Denies all unless marked  HEENT:[]Headache  [] Head Injury/Hearing Loss  [] Sore Throat  [] Ear Ache/Dizziness  [x] Denies all unless marked  Spine:  [] Neck pain  [] Back pain  [] Sciaticia  [x] Denies all unless marked  Cardiovascular:[]Heart Disease []Chest Pain [] Palpitations  [x] Denies all unless marked  Pulmonary: []Shortness of Breath []Cough   [x] Denies all unless marke  Gastrointestinal: []Nausea  []Vomiting  []Abdominal Pain  []Constipation  []Diarrhea  []Dark Bloody Stools  [x] Denies all unless marked  Psychiatric/Behavioral:[] Depression [] Anxiety [x] Denies all unless marked  Genitourinary:   [] Frequency  [] Urgency  [] Incontinence [] Pain with Urination  [x] Denies all unless marked  Extremities: []Pain  []Swelling  [x] Denies all unless marked  Musculoskeletal: [] Muscle Pain  [] Joint Pain  [] Arthritis [] Muscle Cramps [] Muscle Twitches  [x] Denies all unless marked  Sleep: [] Insomnia [] Snoring [] Restless Legs [] Sleep Apnea  [] Daytime Sleepiness  [x] Denies all unless marked  Skin:[] Rash [] Skin Discoloration [x] Denies all unless marked   Neurological: []Visual Disturbance/Memory Loss [] Loss of Balance [] Slurred Speech/Weakness [] Seizures  [] Vertigo/Dizziness [x] Denies all unless marked

## 2024-11-10 ENCOUNTER — HOSPITAL ENCOUNTER (OUTPATIENT)
Dept: MRI IMAGING | Age: 79
Discharge: HOME | End: 2024-11-10
Attending: PSYCHIATRY & NEUROLOGY
Payer: MEDICARE

## 2024-11-10 DIAGNOSIS — R41.3 MEMORY LOSS: ICD-10-CM

## 2024-11-10 PROCEDURE — 70551 MRI BRAIN STEM W/O DYE: CPT

## 2024-11-27 ENCOUNTER — OFFICE VISIT (OUTPATIENT)
Dept: NEUROLOGY | Age: 79
End: 2024-11-27
Payer: MEDICARE

## 2024-11-27 VITALS
OXYGEN SATURATION: 95 % | SYSTOLIC BLOOD PRESSURE: 126 MMHG | HEART RATE: 71 BPM | WEIGHT: 156 LBS | HEIGHT: 67 IN | BODY MASS INDEX: 24.48 KG/M2 | DIASTOLIC BLOOD PRESSURE: 70 MMHG | RESPIRATION RATE: 16 BRPM

## 2024-11-27 DIAGNOSIS — Z86.39 HISTORY OF HYPERLIPIDEMIA: ICD-10-CM

## 2024-11-27 DIAGNOSIS — R41.3 MEMORY LOSS: Primary | ICD-10-CM

## 2024-11-27 DIAGNOSIS — Z86.79 HISTORY OF HYPERTENSION: ICD-10-CM

## 2024-11-27 PROCEDURE — G8400 PT W/DXA NO RESULTS DOC: HCPCS | Performed by: PSYCHIATRY & NEUROLOGY

## 2024-11-27 PROCEDURE — 1036F TOBACCO NON-USER: CPT | Performed by: PSYCHIATRY & NEUROLOGY

## 2024-11-27 PROCEDURE — G8427 DOCREV CUR MEDS BY ELIG CLIN: HCPCS | Performed by: PSYCHIATRY & NEUROLOGY

## 2024-11-27 PROCEDURE — 1090F PRES/ABSN URINE INCON ASSESS: CPT | Performed by: PSYCHIATRY & NEUROLOGY

## 2024-11-27 PROCEDURE — 1159F MED LIST DOCD IN RCRD: CPT | Performed by: PSYCHIATRY & NEUROLOGY

## 2024-11-27 PROCEDURE — 96116 NUBHVL XM PHYS/QHP 1ST HR: CPT | Performed by: PSYCHIATRY & NEUROLOGY

## 2024-11-27 PROCEDURE — 1123F ACP DISCUSS/DSCN MKR DOCD: CPT | Performed by: PSYCHIATRY & NEUROLOGY

## 2024-11-27 PROCEDURE — G8484 FLU IMMUNIZE NO ADMIN: HCPCS | Performed by: PSYCHIATRY & NEUROLOGY

## 2024-11-27 PROCEDURE — G8420 CALC BMI NORM PARAMETERS: HCPCS | Performed by: PSYCHIATRY & NEUROLOGY

## 2024-11-27 PROCEDURE — 99214 OFFICE O/P EST MOD 30 MIN: CPT | Performed by: PSYCHIATRY & NEUROLOGY

## 2024-11-27 NOTE — PROGRESS NOTES
REVIEW OF SYSTEMS    Constitutional: []Fever []Sweat []Chills [] Recent Injury [x] Denies all unless marked  HEENT:[]Headache  [] Head Injury/Hearing Loss  [] Sore Throat  [] Ear Ache/Dizziness  [x] Denies all unless marked  Spine:  [] Neck pain  [] Back pain  [] Sciaticia  [x] Denies all unless marked  Cardiovascular:[]Heart Disease []Chest Pain [] Palpitations  [x] Denies all unless marked  Pulmonary: []Shortness of Breath []Cough   [x] Denies all unless marke  Gastrointestinal: []Nausea  []Vomiting  []Abdominal Pain  []Constipation  []Diarrhea  []Dark Bloody Stools  [x] Denies all unless marked  Psychiatric/Behavioral:[] Depression [] Anxiety [x] Denies all unless marked  Genitourinary:   [] Frequency  [] Urgency  [] Incontinence [] Pain with Urination  [x] Denies all unless marked  Extremities: []Pain  []Swelling  [x] Denies all unless marked  Musculoskeletal: [] Muscle Pain  [] Joint Pain  [] Arthritis [] Muscle Cramps [] Muscle Twitches  [x] Denies all unless marked  Sleep: [] Insomnia [] Snoring [] Restless Legs [] Sleep Apnea  [] Daytime Sleepiness  [x] Denies all unless marked  Skin:[] Rash [] Skin Discoloration [x] Denies all unless marked   Neurological: []Visual Disturbance/Memory Loss [] Loss of Balance [] Slurred Speech/Weakness [] Seizures  [] Vertigo/Dizziness [x] Denies all unless marked     
deformities, gait no gross ataxia  Extremities-no clubbing, cyanosis or edema  Skin - warm, dry, and intact.  No rash, erythema, or pallor.  Psychiatric - mood, affect, and behavior appear normal.      Neurological exam  Awake, alert, fluent oriented x 3 appropriate affect  Attention and concentration appear appropriate.  She knew the day of the week and our upcoming holiday.  Speech normal without dysarthria  No clear issues with language of fund of knowledge    Cranial Nerve Exam   CN II- Visual fields grossly unremarkable. VA adequate.   CN III, IV,VI- PERRLA, EOMI, No nystagmus, conjugate eye movements, no ptosis  CN V-sensation intact to LT over face  CN VII-no facial asymmetry  CN VIII-Hearing intact   CN IX and X- Palate elevates in midline  CN XI-good shoulder shrug  CN XII-Tongue midline with no fasciculations or fibrillations    Motor Exam  V/V throughout upper and lower extremities bilaterally, no cogwheeling, normal tone      Reflexes   2+ biceps bilaterally  2+ brachioradialis  2+ triceps  2+patella  2+ ankle jerks    Tremors- no tremors in hands or head noted    Gait  Normal base and speed  Coordination  Finger to nose and MEGHAN-unremarkable    Lab Results   Component Value Date    YCNVUBWX37 739 10/30/2024     Lab Results   Component Value Date    WBC 6.39 10/12/2022    HGB 13.8 10/12/2022    HCT 45.2 (H) 10/12/2022    MCV 98.9 (H) 10/12/2022     (L) 10/12/2022     Lab Results   Component Value Date     07/02/2018    K 3.9 07/02/2018     07/02/2018    CO2 28 07/02/2018    BUN 19 07/02/2018    CREATININE 0.7 07/02/2018    GLUCOSE 129 (H) 07/02/2018    CALCIUM 9.5 07/02/2018    LABGLOM >60 07/02/2018           Assessment    ICD-10-CM    1. Memory loss  R41.3       2. History of hyperlipidemia  Z86.39       3. 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  Has poor concentration.  Take a baby aspirin a day for vascular changes noted on MRI.  Consider

## 2025-02-20 ENCOUNTER — TELEPHONE (OUTPATIENT)
Dept: NEUROLOGY | Age: 80
End: 2025-02-20

## 2025-02-20 NOTE — TELEPHONE ENCOUNTER
Leda with Dr. Hardik Davila's office called requesting to schedule appt for pt in March. I informed her that next available for Dr. Nielson was 5/29/25. Leda states that Dr. Davila is adamant that pt needs to be seen in March. Please contact Dr. Davila's office to discuss, [phone]. Thank you

## 2025-02-20 NOTE — TELEPHONE ENCOUNTER
I have called and spoke with Leda at Dr. Davila office to let her know that patient does have an appointment with Dr. Tillman in March. I am not sure as to why pre-service could have not let her know this information when she had called. But is scheduled with Dr. Cordova in March.

## 2025-02-28 ENCOUNTER — HOSPITAL ENCOUNTER (OUTPATIENT)
Dept: WOMENS IMAGING | Age: 80
Discharge: HOME | End: 2025-02-28
Payer: MEDICARE

## 2025-02-28 DIAGNOSIS — Z12.31 ENCOUNTER FOR SCREENING MAMMOGRAM FOR MALIGNANT NEOPLASM OF BREAST: ICD-10-CM

## 2025-02-28 PROCEDURE — 77063 BREAST TOMOSYNTHESIS BI: CPT

## 2025-03-28 ENCOUNTER — OFFICE VISIT (OUTPATIENT)
Dept: NEUROLOGY | Age: 80
End: 2025-03-28
Payer: MEDICARE

## 2025-03-28 VITALS
DIASTOLIC BLOOD PRESSURE: 86 MMHG | OXYGEN SATURATION: 16 % | HEIGHT: 67 IN | HEART RATE: 84 BPM | WEIGHT: 156 LBS | BODY MASS INDEX: 24.48 KG/M2 | SYSTOLIC BLOOD PRESSURE: 128 MMHG

## 2025-03-28 DIAGNOSIS — R41.3 MEMORY LOSS: Primary | ICD-10-CM

## 2025-03-28 DIAGNOSIS — Z86.79 HISTORY OF HYPERTENSION: ICD-10-CM

## 2025-03-28 DIAGNOSIS — Z86.39 HISTORY OF HYPERLIPIDEMIA: ICD-10-CM

## 2025-03-28 PROCEDURE — 1090F PRES/ABSN URINE INCON ASSESS: CPT | Performed by: PSYCHIATRY & NEUROLOGY

## 2025-03-28 PROCEDURE — 1036F TOBACCO NON-USER: CPT | Performed by: PSYCHIATRY & NEUROLOGY

## 2025-03-28 PROCEDURE — 1159F MED LIST DOCD IN RCRD: CPT | Performed by: PSYCHIATRY & NEUROLOGY

## 2025-03-28 PROCEDURE — G8400 PT W/DXA NO RESULTS DOC: HCPCS | Performed by: PSYCHIATRY & NEUROLOGY

## 2025-03-28 PROCEDURE — 99213 OFFICE O/P EST LOW 20 MIN: CPT | Performed by: PSYCHIATRY & NEUROLOGY

## 2025-03-28 PROCEDURE — 1123F ACP DISCUSS/DSCN MKR DOCD: CPT | Performed by: PSYCHIATRY & NEUROLOGY

## 2025-03-28 PROCEDURE — G8420 CALC BMI NORM PARAMETERS: HCPCS | Performed by: PSYCHIATRY & NEUROLOGY

## 2025-03-28 PROCEDURE — G8427 DOCREV CUR MEDS BY ELIG CLIN: HCPCS | Performed by: PSYCHIATRY & NEUROLOGY

## 2025-03-28 RX ORDER — DONEPEZIL HYDROCHLORIDE 5 MG/1
TABLET, FILM COATED ORAL
Qty: 30 TABLET | Refills: 2 | Status: SHIPPED | OUTPATIENT
Start: 2025-03-28

## 2025-03-28 NOTE — PROGRESS NOTES
Chief Complaint   Patient presents with    Results     Patient is here for Neuro trax results       Britney Altman is a 79 y.o. year old female who is seen for evaluation of her poor short-term memory.  Symptoms have been going on for about 6 months..  She recently had blood work showing a beta amyloid 42/40 ratio of 0.101 when the reference range for normal is greater than 0.102, therefore a minor variance.  This might suggest an increased risk for Alzheimer's related pathology.  Thus this consultation.  There is no family history of dementia.    Patient does have a history of breast cancer.  She was initially seen here 10/24.  MRI of the brain showed moderate atrophy and white matter change.  B12 level was normal.  Computerized neuropsychological testing revealed a memory score  below normal.  Attention and information processing speed were unable to be scored due to insufficient data.  Findings most suggestive of amnestic MCI.  Last seen here 11/24 and no changes were made.  She is here today with her .  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

## 2025-05-20 ENCOUNTER — TELEPHONE (OUTPATIENT)
Dept: NEUROLOGY | Age: 80
End: 2025-05-20

## 2025-05-20 NOTE — TELEPHONE ENCOUNTER
Called and left patient a VM to let her know that her appointment for 07-28-25 with Dr. Cordova had to be rescheduled due to the provider is out of the office. Left on VM of when I have patient appointment rescheduled too.

## 2025-06-23 RX ORDER — DONEPEZIL HYDROCHLORIDE 5 MG/1
TABLET, FILM COATED ORAL
Qty: 90 TABLET | Refills: 2 | Status: SHIPPED | OUTPATIENT
Start: 2025-06-23

## 2025-07-23 ENCOUNTER — OFFICE VISIT (OUTPATIENT)
Dept: NEUROLOGY | Age: 80
End: 2025-07-23
Payer: MEDICARE

## 2025-07-23 VITALS
RESPIRATION RATE: 16 BRPM | DIASTOLIC BLOOD PRESSURE: 70 MMHG | SYSTOLIC BLOOD PRESSURE: 134 MMHG | BODY MASS INDEX: 24.48 KG/M2 | HEIGHT: 67 IN | WEIGHT: 156 LBS | HEART RATE: 75 BPM | OXYGEN SATURATION: 97 %

## 2025-07-23 DIAGNOSIS — Z86.39 HISTORY OF HYPERLIPIDEMIA: ICD-10-CM

## 2025-07-23 DIAGNOSIS — R41.3 MEMORY LOSS: Primary | ICD-10-CM

## 2025-07-23 DIAGNOSIS — Z86.79 HISTORY OF HYPERTENSION: ICD-10-CM

## 2025-07-23 PROCEDURE — G8400 PT W/DXA NO RESULTS DOC: HCPCS | Performed by: PSYCHIATRY & NEUROLOGY

## 2025-07-23 PROCEDURE — 1159F MED LIST DOCD IN RCRD: CPT | Performed by: PSYCHIATRY & NEUROLOGY

## 2025-07-23 PROCEDURE — G8427 DOCREV CUR MEDS BY ELIG CLIN: HCPCS | Performed by: PSYCHIATRY & NEUROLOGY

## 2025-07-23 PROCEDURE — 1123F ACP DISCUSS/DSCN MKR DOCD: CPT | Performed by: PSYCHIATRY & NEUROLOGY

## 2025-07-23 PROCEDURE — 1036F TOBACCO NON-USER: CPT | Performed by: PSYCHIATRY & NEUROLOGY

## 2025-07-23 PROCEDURE — G8420 CALC BMI NORM PARAMETERS: HCPCS | Performed by: PSYCHIATRY & NEUROLOGY

## 2025-07-23 PROCEDURE — 99214 OFFICE O/P EST MOD 30 MIN: CPT | Performed by: PSYCHIATRY & NEUROLOGY

## 2025-07-23 PROCEDURE — 1090F PRES/ABSN URINE INCON ASSESS: CPT | Performed by: PSYCHIATRY & NEUROLOGY

## 2025-07-23 RX ORDER — MEMANTINE HYDROCHLORIDE 5 MG/1
TABLET ORAL
Qty: 120 TABLET | Refills: 0 | Status: SHIPPED | OUTPATIENT
Start: 2025-07-23

## 2025-07-23 NOTE — PROGRESS NOTES
Chief Complaint   Patient presents with    Memory Loss     Patient states her memory loss is the same       Britney Altman is a 79 y.o. year old female who is seen for evaluation of her poor short-term memory.  Symptoms have been going on for about a year or so..  She recently had blood work showing a beta amyloid 42/40 ratio of 0.101 when the reference range for normal is greater than 0.102, therefore a minor variance.  This might suggest an increased risk for Alzheimer's related pathology.    There is no family history of dementia.    Patient does have a history of breast cancer.  She was initially seen here 10/24.  MRI of the brain showed moderate atrophy and white matter change.  B12 level was normal.  Computerized neuropsychological testing revealed a memory score  below normal.  Attention and information processing speed were unable to be scored due to insufficient data.  Findings most suggestive of amnestic MCI.  Last seen here 3/25 and Aricept was added. This is causing excessive dreams and sleepiness.  They have no interest in any of the newer infusions for Alzheimer's disease.  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

## (undated) DEVICE — COVER US PRB W5XL96IN LTX W/ GEL

## (undated) DEVICE — 9165 UNIVERSAL PATIENT PLATE: Brand: 3M™

## (undated) DEVICE — DRAIN JACKSON PRATT ROUND 15FR: Brand: CARDINAL HEALTH

## (undated) DEVICE — CLIP INT M L GRN TI TRNSVRS GRV CHEVRON SHP W/ PRECIS TIP

## (undated) DEVICE — MAJOR BSIN SETUP PK

## (undated) DEVICE — SPECIMEN ORIENTATION CHARMS, SIX DISTINCTLY SHAPED STERILE 10MM CHARMS: Brand: MARGINMAP

## (undated) DEVICE — CONMED GOLDLINE ELECTROSURGICAL HANDPIECE, HAND CONTROLLED WITH BLADE ELECTRODE, BUTTON SWITCH, SAFETY HOLSTER AND 10 FT (3 M) CABLE: Brand: CONMED GOLDLINE

## (undated) DEVICE — CHLORAPREP 26ML ORANGE

## (undated) DEVICE — GAUZE,SPONGE,FLUFF,6"X6.75",STRL,10/TRAY: Brand: MEDLINE

## (undated) DEVICE — SUTURE PERMAHAND SZ 2-0 L18IN NONABSORBABLE BLK L26MM SH C012D

## (undated) DEVICE — MINOR CDS: Brand: MEDLINE INDUSTRIES, INC.

## (undated) DEVICE — SUTURE VCRL SZ 3-0 L27IN ABSRB UD L26MM SH 1/2 CIR J416H

## (undated) DEVICE — SUTURE MCRYL SZ 4-0 L18IN ABSRB UD L19MM PS-2 3/8 CIR PRIM Y496G

## (undated) DEVICE — PACK,UNIVERSAL,NO GOWNS: Brand: MEDLINE

## (undated) DEVICE — SKIN MARKER,REGULAR TIP WITH RULER: Brand: DEVON

## (undated) DEVICE — PEN: MARKING STD 100/CS: Brand: MEDICAL ACTION INDUSTRIES

## (undated) DEVICE — PROBE DTECT MARGIN F/LUMPECTOMY

## (undated) DEVICE — JACKSON-PRATT 100CC BULB RESERVOIR: Brand: CARDINAL HEALTH

## (undated) DEVICE — STERILE LATEX POWDER FREE SURGICAL GLOVES WITH HYDROGEL COATING: Brand: PROTEXIS

## (undated) DEVICE — SUREFIT, DUAL DISPERSIVE ELECTRODE, CONTACT QUALITY MONITOR: Brand: SUREFIT

## (undated) DEVICE — 3M™ IOBAN™ 2 ANTIMICROBIAL INCISE DRAPE 6650EZ: Brand: IOBAN™ 2

## (undated) DEVICE — SUTURE VCRL SZ 2-0 L36IN ABSRB UD L36MM CT-1 1/2 CIR J945H

## (undated) DEVICE — DRESSING GRMCDL 6 12FR D1N CNTR HOLE 4MM ANTMCRBL PRTCTVE DI

## (undated) DEVICE — GOWN,PREVENTION PLUS,2XL,ST,22/CS: Brand: MEDLINE

## (undated) DEVICE — 3 ML SYRINGE WITH HYPODERMIC SAFETY NEEDLE: Brand: MAGELLAN

## (undated) DEVICE — DISCONTINUED USE 416956 GLOVE 8.5 LTX ST TRIFLEX POWDER LK CF

## (undated) DEVICE — GLOVE SURG SZ 75 CRM LTX FREE POLYISOPRENE POLYMER BEAD ANTI

## (undated) DEVICE — C-ARM: Brand: UNBRANDED

## (undated) DEVICE — SOLUTION IV IRRIG WATER 1000ML POUR BRL 2F7114

## (undated) DEVICE — BULB SYRINGE, IRRIGATION WITH PROTECTIVE CAP, 60 CC, INDIVIDUALLY WRAPPED: Brand: DOVER

## (undated) DEVICE — GLOVE SURG SZ 85 L12IN FNGR ORTHO 126MIL CRM LTX FREE

## (undated) DEVICE — AIRLIFE™ NASAL OXYGEN CANNULA CURVED, NONFLARED TIP, WITH 7' FEET (2.1 M) CRUSH RESISTANT TUBING, OVER-THE-EAR STYLE: Brand: AIRLIFE™

## (undated) DEVICE — AGENT HEMSTAT W4XL8IN OXIDIZED REGENERATED CELOS ABSRB

## (undated) DEVICE — SUTURE VCRL SZ 3-0 L36IN ABSRB UD L36MM CT-1 1/2 CIR J944H

## (undated) DEVICE — ASTOUND STANDARD SURGICAL GOWN, XXL: Brand: CONVERTORS

## (undated) DEVICE — STANDARD HYPODERMIC NEEDLE,POLYPROPYLENE HUB: Brand: MONOJECT

## (undated) DEVICE — THREE QUARTER SHEET: Brand: CONVERTORS

## (undated) DEVICE — GOWN,PREVENTION PLUS,XL,ST,24/CS: Brand: MEDLINE